# Patient Record
Sex: MALE | Race: WHITE | NOT HISPANIC OR LATINO | Employment: FULL TIME | URBAN - METROPOLITAN AREA
[De-identification: names, ages, dates, MRNs, and addresses within clinical notes are randomized per-mention and may not be internally consistent; named-entity substitution may affect disease eponyms.]

---

## 2017-02-20 ENCOUNTER — HOSPITAL ENCOUNTER (OUTPATIENT)
Dept: CARDIOLOGY | Facility: HOSPITAL | Age: 52
Setting detail: HOSPITAL OUTPATIENT SURGERY
Discharge: HOME | End: 2017-02-20
Attending: ORTHOPAEDIC SURGERY | Admitting: ORTHOPAEDIC SURGERY

## 2017-02-20 ENCOUNTER — OFFICE VISIT (OUTPATIENT)
Dept: ORTHOPEDIC SURGERY | Facility: CLINIC | Age: 52
End: 2017-02-20

## 2017-02-20 ENCOUNTER — TELEPHONE (OUTPATIENT)
Dept: ORTHOPEDIC SURGERY | Facility: CLINIC | Age: 52
End: 2017-02-20

## 2017-02-20 VITALS — WEIGHT: 294 LBS | HEIGHT: 73 IN | TEMPERATURE: 98.4 F | BODY MASS INDEX: 38.97 KG/M2

## 2017-02-20 DIAGNOSIS — M79.89 PAIN AND SWELLING OF LOWER LEG, LEFT: ICD-10-CM

## 2017-02-20 DIAGNOSIS — S99.912A LEFT ANKLE INJURY, INITIAL ENCOUNTER: Primary | ICD-10-CM

## 2017-02-20 DIAGNOSIS — M79.662 PAIN AND SWELLING OF LOWER LEG, LEFT: ICD-10-CM

## 2017-02-20 DIAGNOSIS — S99.912A LEFT ANKLE INJURY, INITIAL ENCOUNTER: ICD-10-CM

## 2017-02-20 PROBLEM — M79.669 PAIN AND SWELLING OF LOWER LEG: Status: ACTIVE | Noted: 2017-02-20

## 2017-02-20 LAB
BH CV LOWER VASCULAR LEFT COMMON FEMORAL AUGMENT: NORMAL
BH CV LOWER VASCULAR LEFT COMMON FEMORAL COMPETENT: NORMAL
BH CV LOWER VASCULAR LEFT COMMON FEMORAL COMPRESS: NORMAL
BH CV LOWER VASCULAR LEFT COMMON FEMORAL PHASIC: NORMAL
BH CV LOWER VASCULAR LEFT COMMON FEMORAL SPONT: NORMAL
BH CV LOWER VASCULAR LEFT DISTAL FEMORAL COMPRESS: NORMAL
BH CV LOWER VASCULAR LEFT GASTRONEMIUS COMPRESS: NORMAL
BH CV LOWER VASCULAR LEFT GREATER SAPH AK COMPRESS: NORMAL
BH CV LOWER VASCULAR LEFT GREATER SAPH BK COMPRESS: NORMAL
BH CV LOWER VASCULAR LEFT LESSER SAPH COMPRESS: NORMAL
BH CV LOWER VASCULAR LEFT MID FEMORAL AUGMENT: NORMAL
BH CV LOWER VASCULAR LEFT MID FEMORAL COMPETENT: NORMAL
BH CV LOWER VASCULAR LEFT MID FEMORAL COMPRESS: NORMAL
BH CV LOWER VASCULAR LEFT MID FEMORAL PHASIC: NORMAL
BH CV LOWER VASCULAR LEFT MID FEMORAL SPONT: NORMAL
BH CV LOWER VASCULAR LEFT PERONEAL COMPRESS: NORMAL
BH CV LOWER VASCULAR LEFT POPLITEAL AUGMENT: NORMAL
BH CV LOWER VASCULAR LEFT POPLITEAL COMPETENT: NORMAL
BH CV LOWER VASCULAR LEFT POPLITEAL COMPRESS: NORMAL
BH CV LOWER VASCULAR LEFT POPLITEAL PHASIC: NORMAL
BH CV LOWER VASCULAR LEFT POPLITEAL SPONT: NORMAL
BH CV LOWER VASCULAR LEFT POSTERIOR TIBIAL COMPRESS: NORMAL
BH CV LOWER VASCULAR LEFT PROXIMAL FEMORAL COMPRESS: NORMAL
BH CV LOWER VASCULAR LEFT SAPHENOFEMORAL JUNCTION AUGMENT: NORMAL
BH CV LOWER VASCULAR LEFT SAPHENOFEMORAL JUNCTION COMPETENT: NORMAL
BH CV LOWER VASCULAR LEFT SAPHENOFEMORAL JUNCTION COMPRESS: NORMAL
BH CV LOWER VASCULAR LEFT SAPHENOFEMORAL JUNCTION PHASIC: NORMAL
BH CV LOWER VASCULAR LEFT SAPHENOFEMORAL JUNCTION SPONT: NORMAL
BH CV LOWER VASCULAR RIGHT COMMON FEMORAL AUGMENT: NORMAL
BH CV LOWER VASCULAR RIGHT COMMON FEMORAL COMPETENT: NORMAL
BH CV LOWER VASCULAR RIGHT COMMON FEMORAL COMPRESS: NORMAL
BH CV LOWER VASCULAR RIGHT COMMON FEMORAL PHASIC: NORMAL
BH CV LOWER VASCULAR RIGHT COMMON FEMORAL SPONT: NORMAL

## 2017-02-20 PROCEDURE — 93971 EXTREMITY STUDY: CPT

## 2017-02-20 PROCEDURE — 73610 X-RAY EXAM OF ANKLE: CPT | Performed by: ORTHOPAEDIC SURGERY

## 2017-02-20 PROCEDURE — 99204 OFFICE O/P NEW MOD 45 MIN: CPT | Performed by: ORTHOPAEDIC SURGERY

## 2017-02-20 NOTE — PROGRESS NOTES
"   New Patient Complaint      Patient: Fran Houser  YOB: 1965 51 y.o. male  Medical Record Number: [id number]    Chief Complaints: My leg hurts    History of Present Illness:   Patient fell 5-6 weeks go through a deck steps injuring the anterior aspect of his left lower leg and quite a bit of swelling pain and bruising the digit improved to some degree but that she's got more active walking his dog etc. he's had significant increase in pain now with moderate to severe constant burning pain over the anterior aspect of his left leg was some redness and swelling worse with prolonged standing improved anti-inflammatories ice and rest.  His leg is less swollen when he gets up in the morning but swells progressively during the day.       HPI    Allergies: No Known Allergies    Medications:   No current outpatient prescriptions on file prior to visit.     No current facility-administered medications on file prior to visit.        History reviewed. No pertinent past medical history.  History reviewed. No pertinent past surgical history.  Social History     Occupational History   • Not on file.     Social History Main Topics   • Smoking status: Never Smoker   • Smokeless tobacco: Not on file   • Alcohol use Yes   • Drug use: Not on file   • Sexual activity: Not on file      Social History     Social History Narrative   • No narrative on file     History reviewed. No pertinent family history.    Review of Systems: 14 point review of systems performed, positive pertinent findings identified in HPI. All remaining systems negative     Review of Systems      Physical Exam:   Vitals:    02/20/17 1453   Temp: 98.4 °F (36.9 °C)   Weight: 294 lb (133 kg)   Height: 73\" (185.4 cm)   Physical Exam   Constitutional: pleasant, well developed   Eyes: sclera non icteric  Hearing : adequate for exam  Cardiovascular: palpable pulses in foot, calf/ thigh NT without sign of DVT  Respiratoy: breathing unlabored   Neurological: " grossly sensate to LT throughout LE  Psychiatric: oriented with normal mood and affect.   Lymphatic: No palpable popliteal lymphadenopathy  Skin: intact throughout leg/foot  Musculoskeletal: Moderate swelling to both legs it's somewhat more on the left than the right.  Left leg shows skin to be intact there is little bit of redness over the anterior aspect of his left distal leg with moderate tenderness to palpation to this area.  There is no surrounding erythema or sign of cellulitis.  Moderate swelling to the left calf.  Mild discomfort of the ankle with range of motion minimal discomfort over the right lateral fibula.  Physical Exam  Ortho Exam    Radiology: 3 views left ankle ordered today to evaluate ankle pain reviewed no prior x-rays available for comparison.  I don't see any obvious fracture though there is a questionable area over the distal posterior fibula.    Assessment/Plan: Left leg pain after injury of unclear etiology.  I'll see any obvious fracture but I worry about an evolving stress fracture or stress reaction given his increased discomfort with activities.  When have him take things very easy.  We decided to hold off on a boot today that he will limit activity as pain allows.  1 to get an MRI of his left ankle with a gel marker to make shows no obvious fracture of the distal one third of the tibia.  Also fitted him with some compression hose to help with swelling and get a Doppler to make sure there is no blood clot.  I will see him back pending those results.

## 2017-05-09 ENCOUNTER — OFFICE VISIT (OUTPATIENT)
Dept: RETAIL CLINIC | Facility: CLINIC | Age: 52
End: 2017-05-09

## 2017-05-09 VITALS
HEART RATE: 70 BPM | SYSTOLIC BLOOD PRESSURE: 114 MMHG | TEMPERATURE: 99.5 F | DIASTOLIC BLOOD PRESSURE: 72 MMHG | RESPIRATION RATE: 18 BRPM | OXYGEN SATURATION: 97 %

## 2017-05-09 DIAGNOSIS — H66.002 ACUTE SUPPURATIVE OTITIS MEDIA OF LEFT EAR WITHOUT SPONTANEOUS RUPTURE OF TYMPANIC MEMBRANE, RECURRENCE NOT SPECIFIED: ICD-10-CM

## 2017-05-09 DIAGNOSIS — H60.392 OTHER INFECTIVE ACUTE OTITIS EXTERNA OF LEFT EAR: ICD-10-CM

## 2017-05-09 DIAGNOSIS — J01.01 ACUTE RECURRENT MAXILLARY SINUSITIS: Primary | ICD-10-CM

## 2017-05-09 PROCEDURE — 99203 OFFICE O/P NEW LOW 30 MIN: CPT | Performed by: NURSE PRACTITIONER

## 2017-05-09 RX ORDER — FLUTICASONE PROPIONATE 50 MCG
2 SPRAY, SUSPENSION (ML) NASAL DAILY
Qty: 1 EACH | Refills: 0 | Status: SHIPPED | OUTPATIENT
Start: 2017-05-09 | End: 2017-06-08

## 2017-05-09 RX ORDER — CEFDINIR 300 MG/1
300 CAPSULE ORAL 2 TIMES DAILY
Qty: 20 CAPSULE | Refills: 0 | Status: SHIPPED | OUTPATIENT
Start: 2017-05-09 | End: 2017-05-19

## 2017-08-21 ENCOUNTER — APPOINTMENT (OUTPATIENT)
Dept: CT IMAGING | Facility: HOSPITAL | Age: 52
End: 2017-08-21

## 2017-08-21 ENCOUNTER — HOSPITAL ENCOUNTER (EMERGENCY)
Facility: HOSPITAL | Age: 52
Discharge: HOME | End: 2017-08-21
Admitting: PHYSICIAN ASSISTANT

## 2017-08-21 VITALS
HEIGHT: 73 IN | HEART RATE: 57 BPM | BODY MASS INDEX: 38.3 KG/M2 | DIASTOLIC BLOOD PRESSURE: 80 MMHG | TEMPERATURE: 98.4 F | SYSTOLIC BLOOD PRESSURE: 137 MMHG | RESPIRATION RATE: 18 BRPM | WEIGHT: 289 LBS | OXYGEN SATURATION: 97 %

## 2017-08-21 DIAGNOSIS — R19.7 DIARRHEA, UNSPECIFIED TYPE: Primary | ICD-10-CM

## 2017-08-21 LAB
ALBUMIN SERPL-MCNC: 4.2 G/DL (ref 3.5–5.2)
ALBUMIN/GLOB SERPL: 1.6 G/DL
ALP SERPL-CCNC: 57 U/L (ref 40–129)
ALT SERPL W P-5'-P-CCNC: 37 U/L (ref 5–41)
ANION GAP SERPL CALCULATED.3IONS-SCNC: 11.3 MMOL/L
AST SERPL-CCNC: 22 U/L (ref 5–40)
BASOPHILS # BLD AUTO: 0.02 10*3/MM3 (ref 0–0.2)
BASOPHILS NFR BLD AUTO: 0.4 % (ref 0–2)
BILIRUB SERPL-MCNC: 0.4 MG/DL (ref 0.2–1.2)
BILIRUB UR QL STRIP: NEGATIVE
BUN BLD-MCNC: 7 MG/DL (ref 6–20)
BUN/CREAT SERPL: 7.3 (ref 7–25)
CALCIUM SPEC-SCNC: 8.8 MG/DL (ref 8.6–10.5)
CHLORIDE SERPL-SCNC: 101 MMOL/L (ref 98–107)
CLARITY UR: CLEAR
CO2 SERPL-SCNC: 25.7 MMOL/L (ref 22–29)
COLOR UR: YELLOW
CREAT BLD-MCNC: 0.96 MG/DL (ref 0.76–1.27)
D-LACTATE SERPL-SCNC: 1.1 MMOL/L (ref 0.5–2)
DEPRECATED RDW RBC AUTO: 39.8 FL (ref 37–54)
EOSINOPHIL # BLD AUTO: 0.19 10*3/MM3 (ref 0.1–0.3)
EOSINOPHIL NFR BLD AUTO: 3.4 % (ref 0–4)
ERYTHROCYTE [DISTWIDTH] IN BLOOD BY AUTOMATED COUNT: 12.2 % (ref 11.5–14.5)
GFR SERPL CREATININE-BSD FRML MDRD: 83 ML/MIN/1.73
GLOBULIN UR ELPH-MCNC: 2.6 GM/DL
GLUCOSE BLD-MCNC: 97 MG/DL (ref 65–99)
GLUCOSE UR STRIP-MCNC: NEGATIVE MG/DL
HCT VFR BLD AUTO: 41.1 % (ref 42–52)
HGB BLD-MCNC: 14 G/DL (ref 14–18)
HGB UR QL STRIP.AUTO: NEGATIVE
IMM GRANULOCYTES # BLD: 0.01 10*3/MM3 (ref 0–0.03)
IMM GRANULOCYTES NFR BLD: 0.2 % (ref 0–0.5)
KETONES UR QL STRIP: NEGATIVE
LEUKOCYTE ESTERASE UR QL STRIP.AUTO: NEGATIVE
LIPASE SERPL-CCNC: 17 U/L (ref 13–60)
LYMPHOCYTES # BLD AUTO: 1.54 10*3/MM3 (ref 0.6–4.8)
LYMPHOCYTES NFR BLD AUTO: 27.5 % (ref 20–45)
MCH RBC QN AUTO: 30.6 PG (ref 27–31)
MCHC RBC AUTO-ENTMCNC: 34.1 G/DL (ref 31–37)
MCV RBC AUTO: 89.7 FL (ref 80–94)
MONOCYTES # BLD AUTO: 0.65 10*3/MM3 (ref 0–1)
MONOCYTES NFR BLD AUTO: 11.6 % (ref 3–8)
NEUTROPHILS # BLD AUTO: 3.2 10*3/MM3 (ref 1.5–8.3)
NEUTROPHILS NFR BLD AUTO: 56.9 % (ref 45–70)
NITRITE UR QL STRIP: NEGATIVE
NRBC BLD MANUAL-RTO: 0 /100 WBC (ref 0–0)
PH UR STRIP.AUTO: <=5 [PH] (ref 4.5–8)
PLATELET # BLD AUTO: 222 10*3/MM3 (ref 140–500)
PMV BLD AUTO: 9.1 FL (ref 7.4–10.4)
POTASSIUM BLD-SCNC: 4.6 MMOL/L (ref 3.5–5.2)
PROT SERPL-MCNC: 6.8 G/DL (ref 6–8.5)
PROT UR QL STRIP: NEGATIVE
RBC # BLD AUTO: 4.58 10*6/MM3 (ref 4.7–6.1)
RBC MORPH BLD: NORMAL
SMALL PLATELETS BLD QL SMEAR: ADEQUATE
SODIUM BLD-SCNC: 138 MMOL/L (ref 136–145)
SP GR UR STRIP: <=1.005 (ref 1–1.03)
UROBILINOGEN UR QL STRIP: NORMAL
WBC MORPH BLD: NORMAL
WBC NRBC COR # BLD: 5.61 10*3/MM3 (ref 4.8–10.8)

## 2017-08-21 PROCEDURE — 74177 CT ABD & PELVIS W/CONTRAST: CPT

## 2017-08-21 PROCEDURE — 99284 EMERGENCY DEPT VISIT MOD MDM: CPT | Performed by: PHYSICIAN ASSISTANT

## 2017-08-21 PROCEDURE — 85025 COMPLETE CBC W/AUTO DIFF WBC: CPT | Performed by: PHYSICIAN ASSISTANT

## 2017-08-21 PROCEDURE — 96361 HYDRATE IV INFUSION ADD-ON: CPT

## 2017-08-21 PROCEDURE — 87046 STOOL CULTR AEROBIC BACT EA: CPT | Performed by: PHYSICIAN ASSISTANT

## 2017-08-21 PROCEDURE — 87493 C DIFF AMPLIFIED PROBE: CPT | Performed by: PHYSICIAN ASSISTANT

## 2017-08-21 PROCEDURE — 80053 COMPREHEN METABOLIC PANEL: CPT | Performed by: PHYSICIAN ASSISTANT

## 2017-08-21 PROCEDURE — 96374 THER/PROPH/DIAG INJ IV PUSH: CPT

## 2017-08-21 PROCEDURE — 85007 BL SMEAR W/DIFF WBC COUNT: CPT | Performed by: PHYSICIAN ASSISTANT

## 2017-08-21 PROCEDURE — 81003 URINALYSIS AUTO W/O SCOPE: CPT | Performed by: PHYSICIAN ASSISTANT

## 2017-08-21 PROCEDURE — 83605 ASSAY OF LACTIC ACID: CPT | Performed by: PHYSICIAN ASSISTANT

## 2017-08-21 PROCEDURE — 87045 FECES CULTURE AEROBIC BACT: CPT | Performed by: PHYSICIAN ASSISTANT

## 2017-08-21 PROCEDURE — 99283 EMERGENCY DEPT VISIT LOW MDM: CPT

## 2017-08-21 PROCEDURE — 0 IOPAMIDOL PER 1 ML: Performed by: PHYSICIAN ASSISTANT

## 2017-08-21 PROCEDURE — 83690 ASSAY OF LIPASE: CPT | Performed by: PHYSICIAN ASSISTANT

## 2017-08-21 RX ORDER — SODIUM CHLORIDE 0.9 % (FLUSH) 0.9 %
10 SYRINGE (ML) INJECTION AS NEEDED
Status: DISCONTINUED | OUTPATIENT
Start: 2017-08-21 | End: 2017-08-21 | Stop reason: HOSPADM

## 2017-08-21 RX ORDER — SACCHAROMYCES BOULARDII 250 MG
250 CAPSULE ORAL 2 TIMES DAILY
Qty: 28 CAPSULE | Refills: 0 | Status: SHIPPED | OUTPATIENT
Start: 2017-08-21 | End: 2017-09-04

## 2017-08-21 RX ORDER — FAMOTIDINE 20 MG/1
20 TABLET, FILM COATED ORAL NIGHTLY PRN
Qty: 30 TABLET | Refills: 0 | Status: ON HOLD | OUTPATIENT
Start: 2017-08-21 | End: 2018-04-30

## 2017-08-21 RX ORDER — CIPROFLOXACIN 500 MG/1
500 TABLET, FILM COATED ORAL 2 TIMES DAILY
Status: ON HOLD | COMMUNITY
End: 2018-04-30

## 2017-08-21 RX ORDER — FAMOTIDINE 10 MG/ML
20 INJECTION, SOLUTION INTRAVENOUS ONCE
Status: COMPLETED | OUTPATIENT
Start: 2017-08-21 | End: 2017-08-21

## 2017-08-21 RX ADMIN — FAMOTIDINE 20 MG: 10 INJECTION, SOLUTION INTRAVENOUS at 13:26

## 2017-08-21 RX ADMIN — IOPAMIDOL 100 ML: 755 INJECTION, SOLUTION INTRAVENOUS at 15:47

## 2017-08-21 RX ADMIN — SODIUM CHLORIDE 1000 ML: 9 INJECTION, SOLUTION INTRAVENOUS at 13:25

## 2017-08-21 NOTE — ED TRIAGE NOTES
"Pt reports that he talked to his dr over the phone per his insurance and received a prescription for Cipro 500 mg BID for 3 days, he took the last one today. When asked what his diagnosis was he said \"I guess it's for the bacteria\" but then said he wasn't sure why he was given Cipro.  Pt reported no other unusual features to his stool, no pain and no other symptoms except occasional chills  "

## 2017-08-21 NOTE — ED PROVIDER NOTES
Subjective   History of Present Illness  History of Present Illness    Chief complaint: Diarrhea    Location: Diffuse abdomen    Quality/Severity:  Watery.  Nonbloody.  Severe.  At least 10 episodes a day    Timing/Duration: 5 days    Modifying Factors: Nothing makes worse or better    Associated Symptoms: Denies abdominal pain.  Denies fevers or chills.  Denies nausea vomiting.  Denies rectal bleeding.  Denies recent travel.    Narrative: 52-year-old male presents with diarrhea for 5 days.  3 days ago he called his insurance online physician and he was given a prescription for Cipro which he started taking.  He was not on any antibiotics prior to the diarrhea starting.  It has not helped diarrhea at all.  He states that initially he did have a foul odor but now it is odorless.  He has never had a colonoscopy.    Review of Systems  General: Denies fevers or chills.  Denies any weakness or fatigue.  Denies any weight loss or weight gain.  SKIN: Denies any rashes lesions or ulcers.  Denies color change.  ENT: Denies sore throat or rhinorrhea.  Denies ear pain.    EYES: Denies any blurred vision.  Denies any change in vision.  Denies any photophobia.  Denies any vision loss.  LUNGS: Denies any shortness of breath or wheezing.  Denies any cough.  Denies any hemoptysis.  CARDIAC: Denies any chest pain.  Denies palpitations.  Denies syncope.  Denies any edema  ABD: + diarrhea. Denies any abdominal pain.  Denies any nausea or vomiting   Denies any rectal bleeding.  Denies constipation  : Denies any dysuria, urgency, frequency or hematuria.  Denies discharge.  Denies flank pain.  NEURO: Denies any focal weakness.  Denies headache.  Denies seizures.  Denies changes in speech or difficulty walking.  ENDOCRINE: Denies polydipsia and polyuria  M/S: Denies arthralgias, back pain, myalgias or neck pain  HEME/LYMPH: Negative for adenopathy. Does not bruise/bleed easily.   PSYCH: Negative for suicidal ideas. Denies anxiety or  depression  review was performed in addition to those in the above all other reviews are negative.      History reviewed. No pertinent past medical history.    No Known Allergies    History reviewed. No pertinent surgical history.    Family History   Problem Relation Age of Onset   • Diabetes Mother    • Thyroid disease Mother    • Hypertension Mother    • Heart disease Father    • Heart attack Father    • Diabetes Maternal Grandfather        Social History     Social History   • Marital status:      Spouse name: N/A   • Number of children: N/A   • Years of education: N/A     Social History Main Topics   • Smoking status: Never Smoker   • Smokeless tobacco: None   • Alcohol use Yes      Comment: rare   • Drug use: No   • Sexual activity: Yes     Partners: Female     Other Topics Concern   • None     Social History Narrative     No current facility-administered medications on file prior to encounter.      Current Outpatient Prescriptions on File Prior to Encounter   Medication Sig Dispense Refill   • Diphenhydramine-APAP, sleep, (TYLENOL PM EXTRA STRENGTH PO) Take  by mouth.             Objective   Physical Exam  Vitals:    08/21/17 1234   BP:    Pulse:    Resp:    Temp: 98.4 °F (36.9 °C)   SpO2:    Mature 98.4.  Blood pressure 148/85.  Heart rate 60.  Respirations 18.  Oxygen saturation is 97% on room air.    GENERAL: a/o x 4, NAD  SKIN: Warm pink and dry   HEENT:  PERRLA, EOM intact, conjunctiva normal, sclera clear  NECK: supple, no JVD  LUNGS: Clear to auscultation bilaterally without wheezes, rales or rhonchi.  No accessory muscle use and no nasal flaring.  CARDIAC:  Regular rate and rhythm, S1-S2.  No murmurs, rubs or gallops.  No peripheral edema.  Equal pulses bilaterally.  ABDOMEN: Soft, diffuse mild tenderness.  nondistended.  No guarding or rebound tenderness.  Normal bowel sounds.  MUSCULOSKELETAL: Moves all extremities well.  No deformity.  NEURO: Cranial nerves II through XII grossly intact.  No  gross focal deficits.  Alert.  Normal speech and motor.  PSYCH: Normal mood and affect        Procedures         ED Course  ED Course    IVF, pepcid given.    Results for orders placed or performed during the hospital encounter of 08/21/17   Comprehensive Metabolic Panel   Result Value Ref Range    Glucose 97 65 - 99 mg/dL    BUN 7 6 - 20 mg/dL    Creatinine 0.96 0.76 - 1.27 mg/dL    Sodium 138 136 - 145 mmol/L    Potassium 4.6 3.5 - 5.2 mmol/L    Chloride 101 98 - 107 mmol/L    CO2 25.7 22.0 - 29.0 mmol/L    Calcium 8.8 8.6 - 10.5 mg/dL    Total Protein 6.8 6.0 - 8.5 g/dL    Albumin 4.20 3.50 - 5.20 g/dL    ALT (SGPT) 37 5 - 41 U/L    AST (SGOT) 22 5 - 40 U/L    Alkaline Phosphatase 57 40 - 129 U/L    Total Bilirubin 0.4 0.2 - 1.2 mg/dL    eGFR Non African Amer 83 >60 mL/min/1.73    Globulin 2.6 gm/dL    A/G Ratio 1.6 g/dL    BUN/Creatinine Ratio 7.3 7.0 - 25.0    Anion Gap 11.3 mmol/L   Lipase   Result Value Ref Range    Lipase 17 13 - 60 U/L   Urinalysis With / Culture If Indicated   Result Value Ref Range    Color, UA Yellow Yellow, Straw    Appearance, UA Clear Clear    pH, UA <=5.0 4.5 - 8.0    Specific Gravity, UA <=1.005 1.003 - 1.030    Glucose, UA Negative Negative    Ketones, UA Negative Negative, 80 mg/dL (3+), >=160 mg/dL (4+)    Bilirubin, UA Negative Negative    Blood, UA Negative Negative    Protein, UA Negative Negative    Leuk Esterase, UA Negative Negative    Nitrite, UA Negative Negative    Urobilinogen, UA 0.2 E.U./dL 0.2 - 1.0 E.U./dL   Lactic Acid, Plasma   Result Value Ref Range    Lactate 1.1 0.5 - 2.0 mmol/L   CBC Auto Differential   Result Value Ref Range    WBC 5.61 4.80 - 10.80 10*3/mm3    RBC 4.58 (L) 4.70 - 6.10 10*6/mm3    Hemoglobin 14.0 14.0 - 18.0 g/dL    Hematocrit 41.1 (L) 42.0 - 52.0 %    MCV 89.7 80.0 - 94.0 fL    MCH 30.6 27.0 - 31.0 pg    MCHC 34.1 31.0 - 37.0 g/dL    RDW 12.2 11.5 - 14.5 %    RDW-SD 39.8 37.0 - 54.0 fl    MPV 9.1 7.4 - 10.4 fL    Platelets 222 140 - 500  10*3/mm3    Neutrophil % 56.9 45.0 - 70.0 %    Lymphocyte % 27.5 20.0 - 45.0 %    Monocyte % 11.6 (H) 3.0 - 8.0 %    Eosinophil % 3.4 0.0 - 4.0 %    Basophil % 0.4 0.0 - 2.0 %    Immature Grans % 0.2 0.0 - 0.5 %    Neutrophils, Absolute 3.20 1.50 - 8.30 10*3/mm3    Lymphocytes, Absolute 1.54 0.60 - 4.80 10*3/mm3    Monocytes, Absolute 0.65 0.00 - 1.00 10*3/mm3    Eosinophils, Absolute 0.19 0.10 - 0.30 10*3/mm3    Basophils, Absolute 0.02 0.00 - 0.20 10*3/mm3    Immature Grans, Absolute 0.01 0.00 - 0.03 10*3/mm3    nRBC 0.0 0.0 - 0.0 /100 WBC   Scan Slide   Result Value Ref Range    RBC Morphology Normal Normal    WBC Morphology Normal Normal    Platelet Estimate Adequate Normal     Ct Abdomen Pelvis With Contrast    Result Date: 8/21/2017  Narrative: INDICATION: Diarrhea for 6 days. Nausea..  TECHNIQUE: CT of the abdomen and pelvis with p.o. and IV contrast. Coronal and sagittal reconstructions were obtained.  Radiation dose reduction techniques were utilized, including automated exposure control and exposure modulation based on body size.  COMPARISON: None available..  FINDINGS: Abdomen: The solid abdominal organs are normal. The gallbladder is not distended.  The bowel is not dilated.  The appendix is normal. Small umbilical hernia.  Pelvis: No pelvic mass or free pelvic fluid.  No enlarged pelvic or inguinal lymph nodes..  No acute osseous abnormalities. There is an old transverse process fracture on the right at L2.      Impression: Impression: No acute findings in the abdomen or pelvis.  This report was finalized on 8/21/2017 3:54 PM by Dr. Reji Torres MD.      Reviewed CT a/p. Independently viewed by me. Interpreted by radiologist. Discussed with pt. Electrolytes and creatinine normal.  Educated patient on diarrhea and need for follow-up.  He has been given information on a diarrhea appropriate diet.            MDM  Number of Diagnoses or Management Options  Diarrhea, unspecified type: new and requires  workup     Amount and/or Complexity of Data Reviewed  Clinical lab tests: reviewed and ordered  Tests in the radiology section of CPT®: ordered and reviewed  Tests in the medicine section of CPT®: ordered and reviewed  Independent visualization of images, tracings, or specimens: yes    Risk of Complications, Morbidity, and/or Mortality  Presenting problems: moderate  Diagnostic procedures: moderate  Management options: moderate    Patient Progress  Patient progress: improved      Differentials include, but not limited to: C. difficile.  Gastroenteritis, viral infection, diverticulitis, infectious, IBD, IBS  Final diagnoses:   Diarrhea, unspecified type       EMR Dragon/Transcription disclaimer:      Much of this encounter note is an electronic transcription/translation of spoken language to printed text. The electronic translation of spoken language may permit erroneous, or at times, nonsensical words or phrases to be inadvertently transcribed; Although I have reviewed the note for such errors, some may still exist.            Mili Beltre PA-C  08/21/17 8068

## 2017-08-22 LAB — C DIFF TOX GENS STL QL NAA+PROBE: NEGATIVE

## 2017-08-23 LAB
BACTERIA SPEC AEROBE CULT: ABNORMAL

## 2018-04-30 ENCOUNTER — ANESTHESIA (OUTPATIENT)
Dept: PERIOP | Facility: HOSPITAL | Age: 53
End: 2018-04-30

## 2018-04-30 ENCOUNTER — HOSPITAL ENCOUNTER (OUTPATIENT)
Facility: HOSPITAL | Age: 53
Discharge: HOME | End: 2018-05-02
Attending: EMERGENCY MEDICINE | Admitting: SURGERY

## 2018-04-30 ENCOUNTER — APPOINTMENT (OUTPATIENT)
Dept: CT IMAGING | Facility: HOSPITAL | Age: 53
End: 2018-04-30
Attending: EMERGENCY MEDICINE

## 2018-04-30 ENCOUNTER — ANESTHESIA EVENT (OUTPATIENT)
Dept: PERIOP | Facility: HOSPITAL | Age: 53
End: 2018-04-30

## 2018-04-30 DIAGNOSIS — K35.30 ACUTE APPENDICITIS WITH LOCALIZED PERITONITIS: Primary | ICD-10-CM

## 2018-04-30 LAB
ALBUMIN SERPL-MCNC: 4.3 G/DL (ref 3.5–5.2)
ALBUMIN/GLOB SERPL: 1.5 G/DL
ALP SERPL-CCNC: 61 U/L (ref 40–129)
ALT SERPL W P-5'-P-CCNC: 48 U/L (ref 5–41)
ANION GAP SERPL CALCULATED.3IONS-SCNC: 12.1 MMOL/L
AST SERPL-CCNC: 25 U/L (ref 5–40)
BASOPHILS # BLD AUTO: 0.02 10*3/MM3 (ref 0–0.2)
BASOPHILS NFR BLD AUTO: 0.2 % (ref 0–2)
BILIRUB SERPL-MCNC: 0.3 MG/DL (ref 0.2–1.2)
BILIRUB UR QL STRIP: NEGATIVE
BUN BLD-MCNC: 12 MG/DL (ref 6–20)
BUN/CREAT SERPL: 14.1 (ref 7–25)
CALCIUM SPEC-SCNC: 9.1 MG/DL (ref 8.6–10.5)
CHLORIDE SERPL-SCNC: 97 MMOL/L (ref 98–107)
CLARITY UR: CLEAR
CO2 SERPL-SCNC: 25.9 MMOL/L (ref 22–29)
COLOR UR: YELLOW
CREAT BLD-MCNC: 0.85 MG/DL (ref 0.76–1.27)
DEPRECATED RDW RBC AUTO: 40.7 FL (ref 37–54)
EOSINOPHIL # BLD AUTO: 0.02 10*3/MM3 (ref 0.1–0.3)
EOSINOPHIL NFR BLD AUTO: 0.2 % (ref 0–4)
ERYTHROCYTE [DISTWIDTH] IN BLOOD BY AUTOMATED COUNT: 12.3 % (ref 11.5–14.5)
GFR SERPL CREATININE-BSD FRML MDRD: 95 ML/MIN/1.73
GLOBULIN UR ELPH-MCNC: 2.9 GM/DL
GLUCOSE BLD-MCNC: 129 MG/DL (ref 65–99)
GLUCOSE UR STRIP-MCNC: ABNORMAL MG/DL
HCT VFR BLD AUTO: 40.8 % (ref 42–52)
HGB BLD-MCNC: 14.3 G/DL (ref 14–18)
HGB UR QL STRIP.AUTO: NEGATIVE
IMM GRANULOCYTES # BLD: 0.07 10*3/MM3 (ref 0–0.03)
IMM GRANULOCYTES NFR BLD: 0.6 % (ref 0–0.5)
KETONES UR QL STRIP: NEGATIVE
LEUKOCYTE ESTERASE UR QL STRIP.AUTO: NEGATIVE
LIPASE SERPL-CCNC: 15 U/L (ref 13–60)
LYMPHOCYTES # BLD AUTO: 0.69 10*3/MM3 (ref 0.6–4.8)
LYMPHOCYTES NFR BLD AUTO: 5.5 % (ref 20–45)
MCH RBC QN AUTO: 31.6 PG (ref 27–31)
MCHC RBC AUTO-ENTMCNC: 35 G/DL (ref 31–37)
MCV RBC AUTO: 90.3 FL (ref 80–94)
MONOCYTES # BLD AUTO: 0.57 10*3/MM3 (ref 0–1)
MONOCYTES NFR BLD AUTO: 4.5 % (ref 3–8)
NEUTROPHILS # BLD AUTO: 11.17 10*3/MM3 (ref 1.5–8.3)
NEUTROPHILS NFR BLD AUTO: 89 % (ref 45–70)
NITRITE UR QL STRIP: NEGATIVE
NRBC BLD MANUAL-RTO: 0 /100 WBC (ref 0–0)
PH UR STRIP.AUTO: 7 [PH] (ref 4.5–8)
PLATELET # BLD AUTO: 237 10*3/MM3 (ref 140–500)
PMV BLD AUTO: 9.8 FL (ref 7.4–10.4)
POTASSIUM BLD-SCNC: 4.1 MMOL/L (ref 3.5–5.2)
PROT SERPL-MCNC: 7.2 G/DL (ref 6–8.5)
PROT UR QL STRIP: NEGATIVE
RBC # BLD AUTO: 4.52 10*6/MM3 (ref 4.7–6.1)
SODIUM BLD-SCNC: 135 MMOL/L (ref 136–145)
SP GR UR STRIP: 1.02 (ref 1–1.03)
UROBILINOGEN UR QL STRIP: ABNORMAL
WBC NRBC COR # BLD: 12.54 10*3/MM3 (ref 4.8–10.8)

## 2018-04-30 PROCEDURE — 85025 COMPLETE CBC W/AUTO DIFF WBC: CPT | Performed by: EMERGENCY MEDICINE

## 2018-04-30 PROCEDURE — 25010000002 MORPHINE PER 10 MG: Performed by: SURGERY

## 2018-04-30 PROCEDURE — 25010000002 HYDROMORPHONE PER 4 MG: Performed by: NURSE ANESTHETIST, CERTIFIED REGISTERED

## 2018-04-30 PROCEDURE — 94799 UNLISTED PULMONARY SVC/PX: CPT

## 2018-04-30 PROCEDURE — G0378 HOSPITAL OBSERVATION PER HR: HCPCS

## 2018-04-30 PROCEDURE — 96360 HYDRATION IV INFUSION INIT: CPT

## 2018-04-30 PROCEDURE — 25010000002 DEXAMETHASONE PER 1 MG: Performed by: NURSE ANESTHETIST, CERTIFIED REGISTERED

## 2018-04-30 PROCEDURE — 87070 CULTURE OTHR SPECIMN AEROBIC: CPT | Performed by: SURGERY

## 2018-04-30 PROCEDURE — 25010000002 NEOSTIGMINE PER 0.5 MG: Performed by: NURSE ANESTHETIST, CERTIFIED REGISTERED

## 2018-04-30 PROCEDURE — 74177 CT ABD & PELVIS W/CONTRAST: CPT

## 2018-04-30 PROCEDURE — 87176 TISSUE HOMOGENIZATION CULTR: CPT | Performed by: SURGERY

## 2018-04-30 PROCEDURE — A9270 NON-COVERED ITEM OR SERVICE: HCPCS | Performed by: SURGERY

## 2018-04-30 PROCEDURE — 80053 COMPREHEN METABOLIC PANEL: CPT | Performed by: EMERGENCY MEDICINE

## 2018-04-30 PROCEDURE — 25010000002 ONDANSETRON PER 1 MG: Performed by: EMERGENCY MEDICINE

## 2018-04-30 PROCEDURE — 63710000001 OXYCODONE-ACETAMINOPHEN 5-325 MG TABLET: Performed by: SURGERY

## 2018-04-30 PROCEDURE — 44970 LAPAROSCOPY APPENDECTOMY: CPT | Performed by: SPECIALIST/TECHNOLOGIST, OTHER

## 2018-04-30 PROCEDURE — 44970 LAPAROSCOPY APPENDECTOMY: CPT | Performed by: SURGERY

## 2018-04-30 PROCEDURE — 25010000002 FENTANYL CITRATE (PF) 100 MCG/2ML SOLUTION: Performed by: NURSE ANESTHETIST, CERTIFIED REGISTERED

## 2018-04-30 PROCEDURE — 96375 TX/PRO/DX INJ NEW DRUG ADDON: CPT

## 2018-04-30 PROCEDURE — 96361 HYDRATE IV INFUSION ADD-ON: CPT

## 2018-04-30 PROCEDURE — 93010 ELECTROCARDIOGRAM REPORT: CPT | Performed by: INTERNAL MEDICINE

## 2018-04-30 PROCEDURE — 25010000002 MIDAZOLAM PER 1 MG: Performed by: NURSE ANESTHETIST, CERTIFIED REGISTERED

## 2018-04-30 PROCEDURE — 96376 TX/PRO/DX INJ SAME DRUG ADON: CPT

## 2018-04-30 PROCEDURE — 99284 EMERGENCY DEPT VISIT MOD MDM: CPT

## 2018-04-30 PROCEDURE — 25010000002 PROPOFOL 10 MG/ML EMULSION: Performed by: NURSE ANESTHETIST, CERTIFIED REGISTERED

## 2018-04-30 PROCEDURE — 99284 EMERGENCY DEPT VISIT MOD MDM: CPT | Performed by: EMERGENCY MEDICINE

## 2018-04-30 PROCEDURE — 25010000003 CEFAZOLIN PER 500 MG: Performed by: SURGERY

## 2018-04-30 PROCEDURE — 25010000002 HYDROMORPHONE PER 4 MG: Performed by: EMERGENCY MEDICINE

## 2018-04-30 PROCEDURE — 25010000002 ONDANSETRON PER 1 MG: Performed by: NURSE ANESTHETIST, CERTIFIED REGISTERED

## 2018-04-30 PROCEDURE — 87075 CULTR BACTERIA EXCEPT BLOOD: CPT | Performed by: SURGERY

## 2018-04-30 PROCEDURE — 81003 URINALYSIS AUTO W/O SCOPE: CPT | Performed by: EMERGENCY MEDICINE

## 2018-04-30 PROCEDURE — 0 IOPAMIDOL PER 1 ML: Performed by: EMERGENCY MEDICINE

## 2018-04-30 PROCEDURE — 25010000002 SUCCINYLCHOLINE PER 20 MG: Performed by: NURSE ANESTHETIST, CERTIFIED REGISTERED

## 2018-04-30 PROCEDURE — 93005 ELECTROCARDIOGRAM TRACING: CPT | Performed by: SURGERY

## 2018-04-30 PROCEDURE — 87205 SMEAR GRAM STAIN: CPT | Performed by: SURGERY

## 2018-04-30 PROCEDURE — 99220 PR INITIAL OBSERVATION CARE/DAY 70 MINUTES: CPT | Performed by: SURGERY

## 2018-04-30 PROCEDURE — 83690 ASSAY OF LIPASE: CPT | Performed by: EMERGENCY MEDICINE

## 2018-04-30 RX ORDER — MAGNESIUM HYDROXIDE 1200 MG/15ML
LIQUID ORAL AS NEEDED
Status: DISCONTINUED | OUTPATIENT
Start: 2018-04-30 | End: 2018-04-30 | Stop reason: HOSPADM

## 2018-04-30 RX ORDER — HYDROMORPHONE HCL 110MG/55ML
0.5 PATIENT CONTROLLED ANALGESIA SYRINGE INTRAVENOUS ONCE
Status: COMPLETED | OUTPATIENT
Start: 2018-04-30 | End: 2018-04-30

## 2018-04-30 RX ORDER — MIDAZOLAM HYDROCHLORIDE 1 MG/ML
1 INJECTION INTRAMUSCULAR; INTRAVENOUS
Status: DISCONTINUED | OUTPATIENT
Start: 2018-04-30 | End: 2018-04-30 | Stop reason: HOSPADM

## 2018-04-30 RX ORDER — MORPHINE SULFATE 10 MG/ML
4 INJECTION INTRAMUSCULAR; INTRAVENOUS; SUBCUTANEOUS
Status: DISCONTINUED | OUTPATIENT
Start: 2018-04-30 | End: 2018-05-02 | Stop reason: HOSPADM

## 2018-04-30 RX ORDER — DEXAMETHASONE SODIUM PHOSPHATE 4 MG/ML
8 INJECTION, SOLUTION INTRA-ARTICULAR; INTRALESIONAL; INTRAMUSCULAR; INTRAVENOUS; SOFT TISSUE ONCE AS NEEDED
Status: COMPLETED | OUTPATIENT
Start: 2018-04-30 | End: 2018-04-30

## 2018-04-30 RX ORDER — FAMOTIDINE 20 MG/50ML
20 INJECTION, SOLUTION INTRAVENOUS EVERY 12 HOURS SCHEDULED
Status: DISCONTINUED | OUTPATIENT
Start: 2018-04-30 | End: 2018-04-30 | Stop reason: HOSPADM

## 2018-04-30 RX ORDER — BUPIVACAINE HYDROCHLORIDE AND EPINEPHRINE 2.5; 5 MG/ML; UG/ML
INJECTION, SOLUTION EPIDURAL; INFILTRATION; INTRACAUDAL; PERINEURAL AS NEEDED
Status: DISCONTINUED | OUTPATIENT
Start: 2018-04-30 | End: 2018-04-30 | Stop reason: HOSPADM

## 2018-04-30 RX ORDER — ONDANSETRON 2 MG/ML
4 INJECTION INTRAMUSCULAR; INTRAVENOUS EVERY 4 HOURS PRN
Status: DISCONTINUED | OUTPATIENT
Start: 2018-04-30 | End: 2018-05-02 | Stop reason: HOSPADM

## 2018-04-30 RX ORDER — MORPHINE SULFATE 2 MG/ML
2 INJECTION, SOLUTION INTRAMUSCULAR; INTRAVENOUS
Status: DISCONTINUED | OUTPATIENT
Start: 2018-04-30 | End: 2018-05-02 | Stop reason: HOSPADM

## 2018-04-30 RX ORDER — MIDAZOLAM HYDROCHLORIDE 1 MG/ML
2 INJECTION INTRAMUSCULAR; INTRAVENOUS
Status: DISCONTINUED | OUTPATIENT
Start: 2018-04-30 | End: 2018-04-30 | Stop reason: HOSPADM

## 2018-04-30 RX ORDER — SUCCINYLCHOLINE CHLORIDE 20 MG/ML
INJECTION INTRAMUSCULAR; INTRAVENOUS AS NEEDED
Status: DISCONTINUED | OUTPATIENT
Start: 2018-04-30 | End: 2018-04-30 | Stop reason: SURG

## 2018-04-30 RX ORDER — SODIUM CHLORIDE 0.9 % (FLUSH) 0.9 %
1-10 SYRINGE (ML) INJECTION AS NEEDED
Status: DISCONTINUED | OUTPATIENT
Start: 2018-04-30 | End: 2018-04-30 | Stop reason: HOSPADM

## 2018-04-30 RX ORDER — ONDANSETRON 2 MG/ML
4 INJECTION INTRAMUSCULAR; INTRAVENOUS ONCE
Status: COMPLETED | OUTPATIENT
Start: 2018-04-30 | End: 2018-04-30

## 2018-04-30 RX ORDER — LIDOCAINE HYDROCHLORIDE 20 MG/ML
INJECTION, SOLUTION INFILTRATION; PERINEURAL AS NEEDED
Status: DISCONTINUED | OUTPATIENT
Start: 2018-04-30 | End: 2018-04-30 | Stop reason: SURG

## 2018-04-30 RX ORDER — OXYCODONE HYDROCHLORIDE AND ACETAMINOPHEN 5; 325 MG/1; MG/1
1 TABLET ORAL EVERY 4 HOURS PRN
Status: DISCONTINUED | OUTPATIENT
Start: 2018-04-30 | End: 2018-05-02 | Stop reason: HOSPADM

## 2018-04-30 RX ORDER — SODIUM CHLORIDE, SODIUM LACTATE, POTASSIUM CHLORIDE, CALCIUM CHLORIDE 600; 310; 30; 20 MG/100ML; MG/100ML; MG/100ML; MG/100ML
125 INJECTION, SOLUTION INTRAVENOUS CONTINUOUS
Status: DISCONTINUED | OUTPATIENT
Start: 2018-04-30 | End: 2018-05-01

## 2018-04-30 RX ORDER — FENTANYL CITRATE 50 UG/ML
INJECTION, SOLUTION INTRAMUSCULAR; INTRAVENOUS AS NEEDED
Status: DISCONTINUED | OUTPATIENT
Start: 2018-04-30 | End: 2018-04-30 | Stop reason: SURG

## 2018-04-30 RX ORDER — SODIUM CHLORIDE, SODIUM LACTATE, POTASSIUM CHLORIDE, CALCIUM CHLORIDE 600; 310; 30; 20 MG/100ML; MG/100ML; MG/100ML; MG/100ML
100 INJECTION, SOLUTION INTRAVENOUS CONTINUOUS
Status: DISCONTINUED | OUTPATIENT
Start: 2018-04-30 | End: 2018-05-01

## 2018-04-30 RX ORDER — SODIUM CHLORIDE 9 MG/ML
40 INJECTION, SOLUTION INTRAVENOUS AS NEEDED
Status: DISCONTINUED | OUTPATIENT
Start: 2018-04-30 | End: 2018-04-30 | Stop reason: HOSPADM

## 2018-04-30 RX ORDER — CEFAZOLIN SODIUM 1 G/3ML
INJECTION, POWDER, FOR SOLUTION INTRAMUSCULAR; INTRAVENOUS
Status: DISPENSED
Start: 2018-04-30 | End: 2018-05-01

## 2018-04-30 RX ORDER — FAMOTIDINE 20 MG/1
20 TABLET, FILM COATED ORAL NIGHTLY PRN
Status: DISCONTINUED | OUTPATIENT
Start: 2018-04-30 | End: 2018-05-02 | Stop reason: HOSPADM

## 2018-04-30 RX ORDER — MULTIPLE VITAMINS W/ MINERALS TAB 9MG-400MCG
1 TAB ORAL DAILY
COMMUNITY
End: 2022-09-20

## 2018-04-30 RX ORDER — FAMOTIDINE 20 MG/1
20 TABLET, FILM COATED ORAL EVERY 12 HOURS SCHEDULED
Status: DISCONTINUED | OUTPATIENT
Start: 2018-04-30 | End: 2018-04-30 | Stop reason: HOSPADM

## 2018-04-30 RX ORDER — ASPIRIN 81 MG/1
81 TABLET, CHEWABLE ORAL DAILY
COMMUNITY
End: 2022-04-06

## 2018-04-30 RX ORDER — CHLORAL HYDRATE 500 MG
CAPSULE ORAL
COMMUNITY
End: 2022-09-20

## 2018-04-30 RX ORDER — GLYCOPYRROLATE 0.2 MG/ML
INJECTION INTRAMUSCULAR; INTRAVENOUS AS NEEDED
Status: DISCONTINUED | OUTPATIENT
Start: 2018-04-30 | End: 2018-04-30 | Stop reason: SURG

## 2018-04-30 RX ORDER — ONDANSETRON 2 MG/ML
4 INJECTION INTRAMUSCULAR; INTRAVENOUS ONCE AS NEEDED
Status: COMPLETED | OUTPATIENT
Start: 2018-04-30 | End: 2018-04-30

## 2018-04-30 RX ORDER — ONDANSETRON 2 MG/ML
4 INJECTION INTRAMUSCULAR; INTRAVENOUS ONCE AS NEEDED
Status: DISCONTINUED | OUTPATIENT
Start: 2018-04-30 | End: 2018-04-30 | Stop reason: HOSPADM

## 2018-04-30 RX ORDER — OXYCODONE HYDROCHLORIDE AND ACETAMINOPHEN 5; 325 MG/1; MG/1
2 TABLET ORAL EVERY 4 HOURS PRN
Status: DISCONTINUED | OUTPATIENT
Start: 2018-04-30 | End: 2018-05-02 | Stop reason: HOSPADM

## 2018-04-30 RX ORDER — ROCURONIUM BROMIDE 10 MG/ML
INJECTION, SOLUTION INTRAVENOUS AS NEEDED
Status: DISCONTINUED | OUTPATIENT
Start: 2018-04-30 | End: 2018-04-30 | Stop reason: SURG

## 2018-04-30 RX ORDER — PROPOFOL 10 MG/ML
VIAL (ML) INTRAVENOUS AS NEEDED
Status: DISCONTINUED | OUTPATIENT
Start: 2018-04-30 | End: 2018-04-30 | Stop reason: SURG

## 2018-04-30 RX ORDER — SODIUM CHLORIDE, SODIUM LACTATE, POTASSIUM CHLORIDE, CALCIUM CHLORIDE 600; 310; 30; 20 MG/100ML; MG/100ML; MG/100ML; MG/100ML
9 INJECTION, SOLUTION INTRAVENOUS CONTINUOUS
Status: DISCONTINUED | OUTPATIENT
Start: 2018-04-30 | End: 2018-04-30

## 2018-04-30 RX ORDER — LIDOCAINE HYDROCHLORIDE 10 MG/ML
0.5 INJECTION, SOLUTION EPIDURAL; INFILTRATION; INTRACAUDAL; PERINEURAL ONCE AS NEEDED
Status: DISCONTINUED | OUTPATIENT
Start: 2018-04-30 | End: 2018-04-30 | Stop reason: HOSPADM

## 2018-04-30 RX ADMIN — METRONIDAZOLE 500 MG: 500 INJECTION, SOLUTION INTRAVENOUS at 22:26

## 2018-04-30 RX ADMIN — MORPHINE SULFATE 2 MG: 10 INJECTION INTRAVENOUS at 20:31

## 2018-04-30 RX ADMIN — HYDROMORPHONE HYDROCHLORIDE 0.5 MG: 2 INJECTION INTRAMUSCULAR; INTRAVENOUS; SUBCUTANEOUS at 12:41

## 2018-04-30 RX ADMIN — OXYCODONE HYDROCHLORIDE AND ACETAMINOPHEN 2 TABLET: 5; 325 TABLET ORAL at 22:26

## 2018-04-30 RX ADMIN — METRONIDAZOLE 500 MG: 500 INJECTION, SOLUTION INTRAVENOUS at 14:04

## 2018-04-30 RX ADMIN — GLYCOPYRROLATE 0.6 MG: 0.2 INJECTION INTRAMUSCULAR; INTRAVENOUS at 17:00

## 2018-04-30 RX ADMIN — ROCURONIUM BROMIDE 25 MG: 10 INJECTION INTRAVENOUS at 15:32

## 2018-04-30 RX ADMIN — ROCURONIUM BROMIDE 10 MG: 10 INJECTION INTRAVENOUS at 16:26

## 2018-04-30 RX ADMIN — ROCURONIUM BROMIDE 5 MG: 10 INJECTION INTRAVENOUS at 15:21

## 2018-04-30 RX ADMIN — PROPOFOL 200 MG: 10 INJECTION, EMULSION INTRAVENOUS at 15:24

## 2018-04-30 RX ADMIN — SUCCINYLCHOLINE CHLORIDE 200 MG: 20 INJECTION, SOLUTION INTRAMUSCULAR; INTRAVENOUS at 15:24

## 2018-04-30 RX ADMIN — ONDANSETRON 4 MG: 2 INJECTION, SOLUTION INTRAMUSCULAR; INTRAVENOUS at 15:09

## 2018-04-30 RX ADMIN — NEOSTIGMINE METHYLSULFATE 4 MG: 1 INJECTION, SOLUTION INTRAMUSCULAR; INTRAVENOUS; SUBCUTANEOUS at 17:00

## 2018-04-30 RX ADMIN — SODIUM CHLORIDE 1000 ML: 9 INJECTION, SOLUTION INTRAVENOUS at 11:52

## 2018-04-30 RX ADMIN — LIDOCAINE HYDROCHLORIDE 100 MG: 20 INJECTION, SOLUTION INFILTRATION; PERINEURAL at 15:21

## 2018-04-30 RX ADMIN — MIDAZOLAM HYDROCHLORIDE 1 MG: 1 INJECTION, SOLUTION INTRAMUSCULAR; INTRAVENOUS at 15:09

## 2018-04-30 RX ADMIN — FENTANYL CITRATE 50 MCG: 50 INJECTION, SOLUTION INTRAMUSCULAR; INTRAVENOUS at 17:00

## 2018-04-30 RX ADMIN — FENTANYL CITRATE 100 MCG: 50 INJECTION, SOLUTION INTRAMUSCULAR; INTRAVENOUS at 15:21

## 2018-04-30 RX ADMIN — ONDANSETRON 4 MG: 2 INJECTION, SOLUTION INTRAMUSCULAR; INTRAVENOUS at 11:52

## 2018-04-30 RX ADMIN — DEXAMETHASONE SODIUM PHOSPHATE 8 MG: 4 INJECTION, SOLUTION INTRAMUSCULAR; INTRAVENOUS at 15:09

## 2018-04-30 RX ADMIN — FAMOTIDINE 20 MG: 20 INJECTION, SOLUTION INTRAVENOUS at 15:09

## 2018-04-30 RX ADMIN — FENTANYL CITRATE 50 MCG: 50 INJECTION, SOLUTION INTRAMUSCULAR; INTRAVENOUS at 16:45

## 2018-04-30 RX ADMIN — CEFAZOLIN 3 G: 1 INJECTION, POWDER, FOR SOLUTION INTRAMUSCULAR; INTRAVENOUS at 15:29

## 2018-04-30 RX ADMIN — IOPAMIDOL 100 ML: 755 INJECTION, SOLUTION INTRAVENOUS at 12:53

## 2018-04-30 RX ADMIN — CEFAZOLIN 3 G: 1 INJECTION, POWDER, FOR SOLUTION INTRAMUSCULAR; INTRAVENOUS at 23:46

## 2018-04-30 RX ADMIN — ROCURONIUM BROMIDE 10 MG: 10 INJECTION INTRAVENOUS at 16:02

## 2018-04-30 RX ADMIN — OXYCODONE HYDROCHLORIDE AND ACETAMINOPHEN 1 TABLET: 5; 325 TABLET ORAL at 18:20

## 2018-04-30 RX ADMIN — HYDROMORPHONE HYDROCHLORIDE 0.5 MG: 1 INJECTION, SOLUTION INTRAMUSCULAR; INTRAVENOUS; SUBCUTANEOUS at 17:43

## 2018-04-30 RX ADMIN — HYDROMORPHONE HYDROCHLORIDE 0.5 MG: 2 INJECTION INTRAMUSCULAR; INTRAVENOUS; SUBCUTANEOUS at 11:52

## 2018-04-30 RX ADMIN — SODIUM CHLORIDE, POTASSIUM CHLORIDE, SODIUM LACTATE AND CALCIUM CHLORIDE 9 ML/HR: 600; 310; 30; 20 INJECTION, SOLUTION INTRAVENOUS at 15:00

## 2018-04-30 NOTE — ANESTHESIA POSTPROCEDURE EVALUATION
Patient: Fran Houser    Procedure Summary     Date:  04/30/18 Room / Location:   [facility] /  [facility]    Anesthesia Start:  1517 Anesthesia Stop:  1717    Procedure:  APPENDECTOMY LAPAROSCOPIC (N/A Abdomen) Diagnosis:       Acute appendicitis with localized peritonitis      (Acute appendicitis with localized peritonitis [K35.3])    Surgeon:  Alex Kuo MD Provider:  Fay Daniel CRNA    Anesthesia Type:  general ASA Status:  2 - Emergent          Anesthesia Type: general  Last vitals  BP   133/71 (04/30/18 1732)   Temp   98 °F (36.7 °C) (04/30/18 1710)   Pulse   (!) 45 (04/30/18 1732)   Resp   15 (04/30/18 1732)     SpO2   95 % (04/30/18 1732)     Post Anesthesia Care and Evaluation    Patient location during evaluation: bedside  Patient participation: complete - patient participated  Level of consciousness: awake  Pain management: adequate  Airway patency: patent  Anesthetic complications: No anesthetic complications  PONV Status: none  Cardiovascular status: acceptable  Respiratory status: acceptable  Hydration status: acceptable

## 2018-04-30 NOTE — ANESTHESIA PROCEDURE NOTES
Airway  Urgency: elective    Date/Time: 4/30/2018 3:25 PM  End Time:4/30/2018 3:25 PM    General Information and Staff    Patient location during procedure: OR  CRNA: VENITA DÍAZ    Indications and Patient Condition  Indications for airway management: airway protection    Preoxygenated: yes  MILS maintained throughout  Mask difficulty assessment: 0 - not attempted (RSI)    Final Airway Details  Final airway type: endotracheal airway      Successful airway: ETT  Cuffed: yes   Successful intubation technique: direct laryngoscopy  Facilitating devices/methods: intubating stylet and cricoid pressure  Endotracheal tube insertion site: oral  Blade: Stover  Blade size: #2  ETT size: 7.5 mm  Cormack-Lehane Classification: grade IIa - partial view of glottis  Placement verified by: chest auscultation and capnometry   Measured from: lips  ETT to lips (cm): 23  Number of attempts at approach: 1    Additional Comments  BEBS, +ETCO2, VSS. TEETH AND GUMS AS PRE-OP.

## 2018-04-30 NOTE — ANESTHESIA PREPROCEDURE EVALUATION
Anesthesia Evaluation     Patient summary reviewed and Nursing notes reviewed   no history of anesthetic complications:  NPO Solid Status: > 8 hours  NPO Liquid Status: > 4 hours           Airway   Mallampati: III  TM distance: >3 FB  Neck ROM: full  Possible difficult intubation  Dental - normal exam     Pulmonary - normal exam    breath sounds clear to auscultation  (+) sleep apnea ( not diagnosed but significant snoring),   Cardiovascular - negative cardio ROS and normal exam  Exercise tolerance: good (4-7 METS)    Rhythm: regular  Rate: normal        Neuro/Psych- negative ROS  GI/Hepatic/Renal/Endo    (+) obesity,       Musculoskeletal (-) negative ROS    Abdominal   (+) obese,    Substance History - negative use     OB/GYN negative ob/gyn ROS         Other - negative ROS       ROS/Med Hx Other: Sips of water at 1000    SINUS RHYTHM  $(QMBW) . BASELINE WANDER IN LEAD(S) I II aVR V4                Anesthesia Plan    ASA 2 - emergent     general     intravenous induction   Anesthetic plan and risks discussed with patient.  Use of blood products discussed with patient  Consented to blood products.

## 2018-05-01 PROCEDURE — G0378 HOSPITAL OBSERVATION PER HR: HCPCS

## 2018-05-01 PROCEDURE — 25010000003 CEFAZOLIN PER 500 MG: Performed by: SURGERY

## 2018-05-01 PROCEDURE — 99024 POSTOP FOLLOW-UP VISIT: CPT | Performed by: SURGERY

## 2018-05-01 PROCEDURE — A9270 NON-COVERED ITEM OR SERVICE: HCPCS | Performed by: SURGERY

## 2018-05-01 PROCEDURE — 25010000002 MORPHINE PER 10 MG: Performed by: SURGERY

## 2018-05-01 PROCEDURE — 63710000001 OXYCODONE-ACETAMINOPHEN 5-325 MG TABLET: Performed by: SURGERY

## 2018-05-01 RX ADMIN — METRONIDAZOLE 500 MG: 500 INJECTION, SOLUTION INTRAVENOUS at 22:19

## 2018-05-01 RX ADMIN — MORPHINE SULFATE 4 MG: 10 INJECTION INTRAVENOUS at 23:29

## 2018-05-01 RX ADMIN — MORPHINE SULFATE 4 MG: 10 INJECTION INTRAVENOUS at 04:05

## 2018-05-01 RX ADMIN — MORPHINE SULFATE 4 MG: 10 INJECTION INTRAVENOUS at 00:22

## 2018-05-01 RX ADMIN — METRONIDAZOLE 500 MG: 500 INJECTION, SOLUTION INTRAVENOUS at 05:29

## 2018-05-01 RX ADMIN — CEFAZOLIN 3 G: 1 INJECTION, POWDER, FOR SOLUTION INTRAMUSCULAR; INTRAVENOUS at 15:38

## 2018-05-01 RX ADMIN — MORPHINE SULFATE 4 MG: 10 INJECTION INTRAVENOUS at 08:11

## 2018-05-01 RX ADMIN — MORPHINE SULFATE 4 MG: 10 INJECTION INTRAVENOUS at 19:04

## 2018-05-01 RX ADMIN — CEFAZOLIN 3 G: 1 INJECTION, POWDER, FOR SOLUTION INTRAMUSCULAR; INTRAVENOUS at 23:29

## 2018-05-01 RX ADMIN — OXYCODONE HYDROCHLORIDE AND ACETAMINOPHEN 2 TABLET: 5; 325 TABLET ORAL at 22:19

## 2018-05-01 RX ADMIN — METRONIDAZOLE 500 MG: 500 INJECTION, SOLUTION INTRAVENOUS at 13:56

## 2018-05-01 RX ADMIN — CEFAZOLIN 3 G: 1 INJECTION, POWDER, FOR SOLUTION INTRAMUSCULAR; INTRAVENOUS at 06:30

## 2018-05-01 RX ADMIN — OXYCODONE HYDROCHLORIDE AND ACETAMINOPHEN 2 TABLET: 5; 325 TABLET ORAL at 06:29

## 2018-05-01 RX ADMIN — MORPHINE SULFATE 4 MG: 10 INJECTION INTRAVENOUS at 13:41

## 2018-05-01 RX ADMIN — OXYCODONE HYDROCHLORIDE AND ACETAMINOPHEN 2 TABLET: 5; 325 TABLET ORAL at 10:58

## 2018-05-01 NOTE — ACP (ADVANCE CARE PLANNING)
Spoke with patient and his wife concerning ACP.  Patient was not feeling well.  I will return on Wednesday, 5/2.    Татьяна Solorio

## 2018-05-01 NOTE — PROGRESS NOTES
Afebrile vital signs are stable.  He is without complaints.  He has only been on clear liquids thus far.  His Minesh-Alan drain is putting out serosanguineous returns.  His operation was discussed.  We will continue his intravenous antibiotics because of the free pus in his pelvis.  We will teach the patient and his wife how to take care of the drain.  He's been encouraged to ambulate and we will advance his diet.

## 2018-05-01 NOTE — NURSING NOTE
Education on drain care provided with patient and spouse, receptive to learning and verbalized understanding of teaching.

## 2018-05-01 NOTE — PLAN OF CARE
Problem: Patient Care Overview  Goal: Plan of Care Review  Outcome: Ongoing (interventions implemented as appropriate)   05/01/18 0320   Coping/Psychosocial   Plan of Care Reviewed With patient   Plan of Care Review   Progress improving   OTHER   Outcome Summary Patient VSS. Pt HR remains ty (same pre-op). Drsg to abd CDI. KENNEY drain present with small amount of sanguinous drainage. Pain controlled with IV and oral pain medication. Will continue to monitor patient.      Goal: Individualization and Mutuality  Outcome: Ongoing (interventions implemented as appropriate)    Goal: Discharge Needs Assessment  Outcome: Ongoing (interventions implemented as appropriate)    Goal: Interprofessional Rounds/Family Conf  Outcome: Ongoing (interventions implemented as appropriate)      Problem: Pain, Acute (Adult)  Goal: Identify Related Risk Factors and Signs and Symptoms  Outcome: Ongoing (interventions implemented as appropriate)    Goal: Acceptable Pain Control/Comfort Level  Outcome: Ongoing (interventions implemented as appropriate)

## 2018-05-02 VITALS
TEMPERATURE: 98.5 F | OXYGEN SATURATION: 97 % | DIASTOLIC BLOOD PRESSURE: 79 MMHG | RESPIRATION RATE: 17 BRPM | HEIGHT: 73 IN | BODY MASS INDEX: 39.49 KG/M2 | SYSTOLIC BLOOD PRESSURE: 126 MMHG | WEIGHT: 298 LBS | HEART RATE: 57 BPM

## 2018-05-02 LAB
BASOPHILS # BLD AUTO: 0.03 10*3/MM3 (ref 0–0.2)
BASOPHILS NFR BLD AUTO: 0.3 % (ref 0–2)
DEPRECATED RDW RBC AUTO: 44.7 FL (ref 37–54)
EOSINOPHIL # BLD AUTO: 0.13 10*3/MM3 (ref 0.1–0.3)
EOSINOPHIL NFR BLD AUTO: 1.4 % (ref 0–4)
ERYTHROCYTE [DISTWIDTH] IN BLOOD BY AUTOMATED COUNT: 12.9 % (ref 11.5–14.5)
HCT VFR BLD AUTO: 39.1 % (ref 42–52)
HGB BLD-MCNC: 13.3 G/DL (ref 14–18)
IMM GRANULOCYTES # BLD: 0.03 10*3/MM3 (ref 0–0.03)
IMM GRANULOCYTES NFR BLD: 0.3 % (ref 0–0.5)
LYMPHOCYTES # BLD AUTO: 1.82 10*3/MM3 (ref 0.6–4.8)
LYMPHOCYTES NFR BLD AUTO: 18.9 % (ref 20–45)
MCH RBC QN AUTO: 32 PG (ref 27–31)
MCHC RBC AUTO-ENTMCNC: 34 G/DL (ref 31–37)
MCV RBC AUTO: 94 FL (ref 80–94)
MONOCYTES # BLD AUTO: 0.99 10*3/MM3 (ref 0–1)
MONOCYTES NFR BLD AUTO: 10.3 % (ref 3–8)
NEUTROPHILS # BLD AUTO: 6.62 10*3/MM3 (ref 1.5–8.3)
NEUTROPHILS NFR BLD AUTO: 68.8 % (ref 45–70)
NRBC BLD MANUAL-RTO: 0 /100 WBC (ref 0–0)
PLATELET # BLD AUTO: 241 10*3/MM3 (ref 140–500)
PMV BLD AUTO: 9.7 FL (ref 7.4–10.4)
RBC # BLD AUTO: 4.16 10*6/MM3 (ref 4.7–6.1)
WBC NRBC COR # BLD: 9.62 10*3/MM3 (ref 4.8–10.8)

## 2018-05-02 PROCEDURE — A9270 NON-COVERED ITEM OR SERVICE: HCPCS | Performed by: SURGERY

## 2018-05-02 PROCEDURE — 99024 POSTOP FOLLOW-UP VISIT: CPT | Performed by: SURGERY

## 2018-05-02 PROCEDURE — 85025 COMPLETE CBC W/AUTO DIFF WBC: CPT | Performed by: SURGERY

## 2018-05-02 PROCEDURE — G0378 HOSPITAL OBSERVATION PER HR: HCPCS

## 2018-05-02 PROCEDURE — 25010000003 CEFAZOLIN PER 500 MG: Performed by: SURGERY

## 2018-05-02 PROCEDURE — 63710000001 OXYCODONE-ACETAMINOPHEN 5-325 MG TABLET: Performed by: SURGERY

## 2018-05-02 RX ORDER — METRONIDAZOLE 500 MG/1
500 TABLET ORAL EVERY 8 HOURS SCHEDULED
Qty: 15 TABLET | Refills: 0 | Status: SHIPPED | OUTPATIENT
Start: 2018-05-02 | End: 2018-05-07

## 2018-05-02 RX ORDER — CEPHALEXIN 500 MG/1
500 CAPSULE ORAL EVERY 8 HOURS SCHEDULED
Status: DISCONTINUED | OUTPATIENT
Start: 2018-05-02 | End: 2018-05-02 | Stop reason: HOSPADM

## 2018-05-02 RX ORDER — CEPHALEXIN 500 MG/1
500 CAPSULE ORAL EVERY 8 HOURS SCHEDULED
Qty: 15 CAPSULE | Refills: 0 | Status: SHIPPED | OUTPATIENT
Start: 2018-05-02 | End: 2018-05-07

## 2018-05-02 RX ORDER — METRONIDAZOLE 500 MG/1
500 TABLET ORAL EVERY 8 HOURS SCHEDULED
Status: DISCONTINUED | OUTPATIENT
Start: 2018-05-02 | End: 2018-05-02 | Stop reason: HOSPADM

## 2018-05-02 RX ORDER — OXYCODONE HYDROCHLORIDE AND ACETAMINOPHEN 5; 325 MG/1; MG/1
1-2 TABLET ORAL EVERY 4 HOURS PRN
Qty: 30 TABLET | Refills: 0 | Status: SHIPPED | OUTPATIENT
Start: 2018-05-02 | End: 2018-06-11

## 2018-05-02 RX ADMIN — OXYCODONE HYDROCHLORIDE AND ACETAMINOPHEN 2 TABLET: 5; 325 TABLET ORAL at 06:02

## 2018-05-02 RX ADMIN — METRONIDAZOLE 500 MG: 500 INJECTION, SOLUTION INTRAVENOUS at 06:02

## 2018-05-02 RX ADMIN — OXYCODONE HYDROCHLORIDE AND ACETAMINOPHEN 2 TABLET: 5; 325 TABLET ORAL at 10:08

## 2018-05-02 RX ADMIN — CEFAZOLIN 3 G: 1 INJECTION, POWDER, FOR SOLUTION INTRAMUSCULAR; INTRAVENOUS at 08:01

## 2018-05-02 NOTE — PLAN OF CARE
Problem: Patient Care Overview  Goal: Plan of Care Review  Outcome: Ongoing (interventions implemented as appropriate)   05/02/18 0312   Coping/Psychosocial   Plan of Care Reviewed With patient   Plan of Care Review   Progress improving   OTHER   Outcome Summary Pt VSS. HR remains ty. Drsg to abd CDI. KENNEY drain present with small amount of sanguinous drainage. Pain controlled with IV and oral pain medication. Will continue to monitor patient.     Goal: Individualization and Mutuality  Outcome: Ongoing (interventions implemented as appropriate)    Goal: Discharge Needs Assessment  Outcome: Ongoing (interventions implemented as appropriate)    Goal: Interprofessional Rounds/Family Conf  Outcome: Ongoing (interventions implemented as appropriate)      Problem: Pain, Acute (Adult)  Goal: Identify Related Risk Factors and Signs and Symptoms  Outcome: Ongoing (interventions implemented as appropriate)    Goal: Acceptable Pain Control/Comfort Level  Outcome: Ongoing (interventions implemented as appropriate)

## 2018-05-02 NOTE — PLAN OF CARE
Problem: Patient Care Overview  Goal: Plan of Care Review  Outcome: Ongoing (interventions implemented as appropriate)   05/02/18 0958   Coping/Psychosocial   Plan of Care Reviewed With patient   OTHER   Outcome Summary Pt VSS, continues on iv abt, tolerates well, up ambulating in rey with sba, pain well controlled with current pain med regimen. No s/s of acute distress noted. Will continue to monitor.     Goal: Individualization and Mutuality  Outcome: Ongoing (interventions implemented as appropriate)    Goal: Discharge Needs Assessment  Outcome: Ongoing (interventions implemented as appropriate)    Goal: Interprofessional Rounds/Family Conf  Outcome: Ongoing (interventions implemented as appropriate)      Problem: Pain, Acute (Adult)  Goal: Identify Related Risk Factors and Signs and Symptoms  Outcome: Outcome(s) achieved Date Met: 05/02/18    Goal: Acceptable Pain Control/Comfort Level  Outcome: Ongoing (interventions implemented as appropriate)

## 2018-05-02 NOTE — PROGRESS NOTES
He feels well.  He is tolerating regular diet.  He's afebrile.  His white blood count is decreasing.  His Minesh-Alan drain is putting out serous returns.  He is up and about.  He will be switched to oral antibiotics and discharged home today.  He can take care of his own drain.  I will see him in the office on monday

## 2018-05-03 LAB
LAB AP CASE REPORT: NORMAL
LAB AP CLINICAL INFORMATION: NORMAL
Lab: NORMAL
PATH REPORT.FINAL DX SPEC: NORMAL

## 2018-05-04 LAB
BACTERIA SPEC AEROBE CULT: NORMAL
GRAM STN SPEC: NORMAL
GRAM STN SPEC: NORMAL

## 2018-05-05 LAB — BACTERIA SPEC ANAEROBE CULT: NORMAL

## 2018-05-07 ENCOUNTER — OFFICE VISIT (OUTPATIENT)
Dept: SURGERY | Facility: CLINIC | Age: 53
End: 2018-05-07

## 2018-05-07 DIAGNOSIS — Z09 SURGICAL FOLLOW-UP CARE: Primary | ICD-10-CM

## 2018-05-07 PROCEDURE — 99024 POSTOP FOLLOW-UP VISIT: CPT | Performed by: SURGERY

## 2018-05-07 NOTE — PROGRESS NOTES
1 WK S/P LAP APPY, NO COMPLAINTS, HAS COMPLETED ABX  He has completed his antibiotics.  He is eating well he denies any fevers or chills.  His Minesh-Alan drain is putting out serous returns and was removed.  One half of his staples were removed.  Activity level was discussed.  His pathology was discussed.  I will see him back in 1 week.

## 2018-05-14 ENCOUNTER — OFFICE VISIT (OUTPATIENT)
Dept: SURGERY | Facility: CLINIC | Age: 53
End: 2018-05-14

## 2018-05-14 DIAGNOSIS — Z09 SURGICAL FOLLOW-UP CARE: Primary | ICD-10-CM

## 2018-05-14 PROCEDURE — 99024 POSTOP FOLLOW-UP VISIT: CPT | Performed by: SURGERY

## 2018-05-14 NOTE — PROGRESS NOTES
2 week s/p appy; having no current sx  He is without complaints his remaining staples were removed.  There is no impulse.  I will see him back in 1 month.  Activity level was discussed.  I also discussed the has bilateral small indirect inguinal hernias.  The risks benefits and options were discussed with he and his wife in detail.

## 2018-06-05 ENCOUNTER — OFFICE VISIT (OUTPATIENT)
Dept: SLEEP MEDICINE | Facility: HOSPITAL | Age: 53
End: 2018-06-05
Attending: INTERNAL MEDICINE

## 2018-06-05 VITALS
DIASTOLIC BLOOD PRESSURE: 78 MMHG | HEIGHT: 72 IN | WEIGHT: 298 LBS | HEART RATE: 68 BPM | SYSTOLIC BLOOD PRESSURE: 137 MMHG | BODY MASS INDEX: 40.36 KG/M2

## 2018-06-05 DIAGNOSIS — R06.83 SNORING: ICD-10-CM

## 2018-06-05 DIAGNOSIS — R06.81 APNEA: ICD-10-CM

## 2018-06-05 DIAGNOSIS — G47.10 HYPERSOMNOLENCE: ICD-10-CM

## 2018-06-05 DIAGNOSIS — G47.33 OSA (OBSTRUCTIVE SLEEP APNEA): Primary | ICD-10-CM

## 2018-06-05 DIAGNOSIS — E66.01 OBESITY, MORBID, BMI 40.0-49.9 (HCC): ICD-10-CM

## 2018-06-05 PROCEDURE — G0463 HOSPITAL OUTPT CLINIC VISIT: HCPCS

## 2018-06-05 NOTE — PROGRESS NOTES
[facility] SLEEP MEDICINE  [address]  3rd Floor  [facility] [address]  [phone]    Referring Physician: Isaiah Tse MD  PCP: Isaiah Tse III, MD    Reason for consult:  Sleep disorders of snoring and apnes    Fran Houser is a 52 y.o.male was seen in the [facility] today. He reports increased snoring and apneas per his wife within past 1 year. He had HST 6 years ago - no evidence CAROLYN. He sleeps from 12MN to 8AM. He feels somewhat groggy when he awakens. He is somewhat refreshed. He feels somewhat sleepy during day, denies drowsiness with driving. He has gained 50lbs over past 5 years. He has frequent awakenings at night. He is not on Percocet per patient.    [place] Sleepiness Score: 6. Caffeine intake 2 per day. Alcohol intake 1 per week.    Fran Houser  has no past medical history on file. AR    Current Medications:    Current Outpatient Prescriptions:   •  aspirin 81 MG chewable tablet, Chew 81 mg Daily., Disp: , Rfl:   •  Multiple Vitamins-Minerals (MULTIVITAMIN WITH MINERALS) tablet tablet, Take 1 tablet by mouth Daily., Disp: , Rfl:   •  Omega-3 Fatty Acids (FISH OIL) 1000 MG capsule capsule, Take  by mouth Daily With Breakfast., Disp: , Rfl:   •  oxyCODONE-acetaminophen (PERCOCET) 5-325 MG per tablet, Take 1-2 tablets by mouth Every 4 (Four) Hours As Needed (pain) for up to 30 doses., Disp: 30 tablet, Rfl: 0   also listed in Sleep Questionnaire.    FH: family history includes Diabetes in his maternal grandfather and mother; Heart attack in his father; Heart disease in his father; Hypertension in his mother; Thyroid disease in his mother.  SH:  reports that he has never smoked. He has never used smokeless tobacco. He reports that he drinks alcohol. He reports that he does not use drugs.    Pertinent Positive Review of Systems of recurrent nose bleed, nasal congestion  Rest of Review of Systems was negative as recorded in Sleep Questionnaire.        Vital Signs: /78  "  Pulse 68   Ht 181.6 cm (71.5\")   Wt 135 kg (298 lb)   BMI 40.98 kg/m²     Body mass index is 40.98 kg/m².       Tongue: large      Dentition: good       Pharynx: Posterior pharyngeal pillars are narrow   Mallampatti: III (soft and hard palate and base of uvula visible)        General: Alert. Cooperative. Well developed. No acute distress.             Head:  Normocephalic. Symmetrical. Atraumatic.              Eyes: Sclera clear. No icterus. PERRLA. Normal EOM.             Ears: No deformities. Normal hearing.             Nose: No septal deviation. No drainage.          Throat: No oral lesions. No thrush. Moist mucous membranes.    Chest Wall:  Normal shape. Symmetric expansion with respiration. No tenderness.             Neck:  Trachea midline.           Lungs:  Clear to auscultation bilaterally. No wheezes. No rhonchi. No rales. Respirations regular, even and unlabored.            Heart:  Regular rhythm and normal rate. Normal S1 and S2. No murmur.     Abdomen:  Soft, non-tender and non-distended. Normal bowel sounds. No masses.  Extremities:  Moves all extremities well. No edema.           Pulses: Pulses palpable and equal bilaterally.               Skin: Dry. Intact. No bleeding. No rash.           Neuro: Moves all 4 extremities and cranial nerves grossly intact.  Psychiatric: Normal mood and affect.    Impression:  1. CAROLYN (obstructive sleep apnea)    2. Hypersomnolence    3. Snoring    4. Apnea    5. Obesity, morbid, BMI 40.0-49.9        Plan:  Fran has typical risk factors for sleep apnea.  Since his previous HST he has gained 50 pounds.  This likely explains increase in symptoms.  I will schedule him for another HST.  I explained to him insurance usually requires this to be done prior to and in lab test.  If the HST fails to reveal an answer he will require an in lab study.  Patient was asked to sleep on his back as much as possible for HST.  IV briefly explained the various treatments for obstructive " sleep apnea.  He very likely will require treatment with a CPAP machine.  Weight loss may help in reducing severity of underlying sleep-disordered breathing.  Cardiovascular health risks of untreated sleep apnea were emphasized.  He does report occasional nosebleeds.  This could indicate some rhinitis issues.  He will need to be cautious with use of CPAP.  He may require adjustment of humidification to optimize.  I also asked him to consider a nasal steroid spray different than Flonase.    This patient has typical features of obstructive sleep apnea with hypersomnolence snoring and apneas along with elevated BMI and classic oropharyngeal structure.  Likelihood of obstructive sleep apnea is high and a polysomnogram study will therefore be requested.      Possible diagnosis and pathophysiology of obstructive sleep apnea was discussed with the patient.  Health risks of untreated obstructive sleep apnea including cardiovascular risks were discussed.  Patient was cautioned about activities requiring full concentration especially driving at night or for longer distances, and until his hypersomnolence is corrected.    Results of sleep testing will be reviewed to see if patient is a candidate for CPAP machine.  Alternatives to therapy include oral mandibular advancing device (OMAD) were discussed. However OMAD is usually only beneficial in mild obstructive sleep apnea.  The benefit of weight loss in reducing severity of obstructive sleep apnea was discussed.  Patient would benefit from adhering to a strict diet to achieve ideal BMI.     Patient will follow up in this clinic after testing.    Thank you for allowing me to participate in your patient's care.    Hermilo Cordon MD    Part of this note may be an electronic transcription/translation of spoken language to printed text using the Dragon Dictation System.

## 2018-06-11 ENCOUNTER — OFFICE VISIT (OUTPATIENT)
Dept: SURGERY | Facility: CLINIC | Age: 53
End: 2018-06-11

## 2018-06-11 DIAGNOSIS — Z09 SURGICAL FOLLOW-UP CARE: Primary | ICD-10-CM

## 2018-06-11 PROCEDURE — 99024 POSTOP FOLLOW-UP VISIT: CPT | Performed by: SURGERY

## 2018-06-11 NOTE — PROGRESS NOTES
6 wks s/p lap appy, some sensitivity when picking up things over 5 pounds but otherwise well   He says he feels some pulling in his incision when he does lifting.  His bowel movements are normal,is eating well.  He denies fevers or chills.  His incisions are healing well.  There is no impulse.  He can resume normal activities and I will see him back when necessary.

## 2018-06-12 ENCOUNTER — HOSPITAL ENCOUNTER (OUTPATIENT)
Dept: SLEEP MEDICINE | Facility: HOSPITAL | Age: 53
Discharge: HOME | End: 2018-06-12
Attending: INTERNAL MEDICINE

## 2018-06-12 DIAGNOSIS — G47.33 OSA (OBSTRUCTIVE SLEEP APNEA): ICD-10-CM

## 2018-07-12 ENCOUNTER — TRANSCRIBE ORDERS (OUTPATIENT)
Dept: SLEEP MEDICINE | Facility: HOSPITAL | Age: 53
End: 2018-07-12

## 2018-07-12 ENCOUNTER — HOSPITAL ENCOUNTER (OUTPATIENT)
Dept: SLEEP MEDICINE | Facility: HOSPITAL | Age: 53
Discharge: HOME | End: 2018-07-12
Attending: INTERNAL MEDICINE | Admitting: INTERNAL MEDICINE

## 2018-07-12 DIAGNOSIS — G47.33 OSA (OBSTRUCTIVE SLEEP APNEA): Primary | ICD-10-CM

## 2018-07-12 DIAGNOSIS — G47.33 OSA (OBSTRUCTIVE SLEEP APNEA): ICD-10-CM

## 2018-07-12 PROCEDURE — 95806 SLEEP STUDY UNATT&RESP EFFT: CPT

## 2018-07-18 ENCOUNTER — TELEPHONE (OUTPATIENT)
Dept: SLEEP MEDICINE | Facility: HOSPITAL | Age: 53
End: 2018-07-18

## 2018-07-18 NOTE — TELEPHONE ENCOUNTER
Tech called home/mobile #, no answer Tech left vm informing pt study results in. Sent DME set up to [facility] and scheduled F/U appt. Pt to return call. MAB

## 2018-07-18 NOTE — TELEPHONE ENCOUNTER
Pt called in and went over study results confirmed DME of [facility] and confirmed F/U appt date and time. MAB

## 2018-08-23 ENCOUNTER — TELEPHONE (OUTPATIENT)
Dept: SLEEP MEDICINE | Facility: HOSPITAL | Age: 53
End: 2018-08-23

## 2018-08-23 NOTE — TELEPHONE ENCOUNTER
Tech called pts mobile # no answer then called pts emergency contact and spoke with them in regards to a better contact # did not have anything different to reach pt on. Pt has yet to be set up with pap equipment per [facility] as they can not reach pt. Per emergency contact pt is wearing a pap therapy device. Tech called [facility] to see if they were able to get pt set up. Waiting for response. MAB

## 2018-11-06 ENCOUNTER — OFFICE VISIT (OUTPATIENT)
Dept: SLEEP MEDICINE | Facility: HOSPITAL | Age: 53
End: 2018-11-06
Attending: INTERNAL MEDICINE

## 2018-11-06 ENCOUNTER — LAB (OUTPATIENT)
Dept: LAB | Facility: HOSPITAL | Age: 53
End: 2018-11-06
Attending: INTERNAL MEDICINE

## 2018-11-06 VITALS
DIASTOLIC BLOOD PRESSURE: 74 MMHG | HEIGHT: 73 IN | SYSTOLIC BLOOD PRESSURE: 144 MMHG | BODY MASS INDEX: 41.22 KG/M2 | HEART RATE: 62 BPM | WEIGHT: 311 LBS

## 2018-11-06 DIAGNOSIS — R53.82 CHRONIC FATIGUE: ICD-10-CM

## 2018-11-06 DIAGNOSIS — J30.89 NON-SEASONAL ALLERGIC RHINITIS, UNSPECIFIED TRIGGER: ICD-10-CM

## 2018-11-06 DIAGNOSIS — G47.33 OBSTRUCTIVE SLEEP APNEA, ADULT: Primary | ICD-10-CM

## 2018-11-06 PROCEDURE — 36415 COLL VENOUS BLD VENIPUNCTURE: CPT

## 2018-11-06 PROCEDURE — 84403 ASSAY OF TOTAL TESTOSTERONE: CPT

## 2018-11-06 PROCEDURE — G0463 HOSPITAL OUTPT CLINIC VISIT: HCPCS

## 2018-11-06 PROCEDURE — 84402 ASSAY OF FREE TESTOSTERONE: CPT

## 2018-11-06 NOTE — PROGRESS NOTES
PULMONARY SLEEP CONSULT NOTE      PATIENT IDENTIFICATION:  Name: Fran Houser  Age: 53 y.o.  Sex: male  :  1965  MRN: [id number]    DATE OF CONSULTATION:  2018   Referring Physician: No admitting provider for patient encounter.                  CHIEF COMPLAINT: Obstructive Sleep Apnea    History of Present Illness:   Fran Houser is a 53 y.o. male Pt on CPAP feeling better more energy especially the night he use it more than 4 hours, no sleepiness no fatigue no tiredness, no mask irritation no dryness, compliance report reviewed with pt AHI< 5 with good usage.   Still not up on his energy and sexual drive      Review of Systems:   Constitutional: negative   Eyes: negative   ENT/oropharynx: negative   Cardiovascular: negative   Respiratory: negative   Gastrointestinal: negative   Genitourinary: negative   Neurological: negative   Musculoskeletal: negative   Integument/breast: negative   Endocrine: negative   Allergic/Immunologic: negative     Past Medical History:  No past medical history on file.    Past Surgical History:  Past Surgical History:   Procedure Laterality Date   • APPENDECTOMY N/A 2018    Procedure: APPENDECTOMY LAPAROSCOPIC;  Surgeon: Alex Kuo MD;  Location: [facility];  Service: General        Family History:  Family History   Problem Relation Age of Onset   • Diabetes Mother    • Thyroid disease Mother    • Hypertension Mother    • Heart disease Father    • Heart attack Father    • Diabetes Maternal Grandfather         Social History:   Social History   Substance Use Topics   • Smoking status: Never Smoker   • Smokeless tobacco: Never Used   • Alcohol use Yes      Comment: rare        Allergies:  No Known Allergies    Home Meds:    (Not in a hospital admission)    Objective:    Vitals Ranges:   Heart Rate:  [62] 62  BP: (144)/(74) 144/74  Body mass index is 41.03 kg/m².     Exam:  General Appearance:  WDWN    HEENT:   without obvious abnormality,  Conjunctiva/corneas clear,   Normal external ear canals, no drainage    Clear orsalmucosa,  Mallampati score 3    Neck:  Supple, symmetrical, trachea midline. No JVD.  Lungs:   Bilateral basal rhonchi bilaterally, respirations unlabored symmetrical wall movement.    Chest wall:  No tenderness or deformity.    Heart:  Regular rate and rhythm, S1 and S2 normal.  Extremities: Trace edema no clubbing or Cyanosis        Data Review:  All labs (24hrs): No results found for this or any previous visit (from the past 24 hour(s)).     Imaging:  [unfilled]    ASSESSMENT:  Diagnoses and all orders for this visit:    Obstructive sleep apnea, adult    Non-seasonal allergic rhinitis, unspecified trigger    Chronic fatigue  -     Testosterone (Free & Total), LC / MS; Future        PLAN:   This is patient with symptoms of obstructive sleep apnea, NPSG study ASAP / split night study, Avoid supine avoid sedative meds in pm, weight loss, Avoid driving. Long discussion with patient about the physiology of CAROLYN, and long term and short term   benefit of treating CAROLYN      It is recommended to keep allergic rhinitis treatment  optimized to improve quality of sleep    Check testerone level  Katrina Mendes MD. D, ABSM.  11/6/2018  11:06 AM

## 2018-11-09 LAB
TESTOST FREE SERPL-MCNC: 5.9 PG/ML (ref 7.2–24)
TESTOST SERPL-MCNC: 282.7 NG/DL (ref 264–916)

## 2019-10-29 ENCOUNTER — OFFICE VISIT (OUTPATIENT)
Dept: SLEEP MEDICINE | Facility: HOSPITAL | Age: 54
End: 2019-10-29
Attending: INTERNAL MEDICINE

## 2019-10-29 VITALS
HEART RATE: 55 BPM | SYSTOLIC BLOOD PRESSURE: 141 MMHG | DIASTOLIC BLOOD PRESSURE: 85 MMHG | HEIGHT: 73 IN | BODY MASS INDEX: 38.43 KG/M2 | WEIGHT: 290 LBS

## 2019-10-29 DIAGNOSIS — R53.82 CHRONIC FATIGUE: ICD-10-CM

## 2019-10-29 DIAGNOSIS — G47.33 OBSTRUCTIVE SLEEP APNEA, ADULT: Primary | ICD-10-CM

## 2019-10-29 PROCEDURE — G0463 HOSPITAL OUTPT CLINIC VISIT: HCPCS

## 2019-10-29 NOTE — PROGRESS NOTES
PULMONARY SLEEP CONSULT NOTE      PATIENT IDENTIFICATION:  Name: Frna Houser  Age: 54 y.o.  Sex: male  :  1965  MRN: [id number]    DATE OF CONSULTATION:  10/29/2019   Referring Physician: No admitting provider for patient encounter.                  CHIEF COMPLAINT: Obstructive Sleep Apnea    History of Present Illness:   Fran Houser is a 54 y.o. male Pt on CPAP feeling better more energy especially the night he use it more than 4 hours, no sleepiness no fatigue no tiredness, no mask irritation no dryness, compliance report reviewed with pt AHI< 5 with good usage.       Review of Systems:   Constitutional: negative   Eyes: negative   ENT/oropharynx: negative   Cardiovascular: negative   Respiratory: negative   Gastrointestinal: negative   Genitourinary: negative   Neurological: negative   Musculoskeletal: negative   Integument/breast: negative   Endocrine: negative   Allergic/Immunologic: negative     Past Medical History:  No past medical history on file.    Past Surgical History:  Past Surgical History:   Procedure Laterality Date   • APPENDECTOMY N/A 2018    Procedure: APPENDECTOMY LAPAROSCOPIC;  Surgeon: Alex Kuo MD;  Location: [facility];  Service: General        Family History:  Family History   Problem Relation Age of Onset   • Diabetes Mother    • Thyroid disease Mother    • Hypertension Mother    • Heart disease Father    • Heart attack Father    • Diabetes Maternal Grandfather         Social History:   Social History     Tobacco Use   • Smoking status: Never Smoker   • Smokeless tobacco: Never Used   Substance Use Topics   • Alcohol use: Yes     Comment: rare        Allergies:  No Known Allergies    Home Meds:    (Not in a hospital admission)    Objective:    Vitals Ranges:   Heart Rate:  [55] 55  BP: (141)/(85) 141/85  Body mass index is 38.26 kg/m².     Exam:  General Appearance:  WDWN    HEENT:   without obvious abnormality,  Conjunctiva/corneas clear,  Normal external ear canals,  no drainage    Clear orsalmucosa,  Mallampati score 3    Neck:  Supple, symmetrical, trachea midline. No JVD.  Lungs:   Bilateral basal rhonchi bilaterally, respirations unlabored symmetrical wall movement.    Chest wall:  No tenderness or deformity.    Heart:  Regular rate and rhythm, S1 and S2 normal.  Extremities: Trace edema no clubbing or Cyanosis        Data Review:  All labs (24hrs): No results found for this or any previous visit (from the past 24 hour(s)).     Imaging:  [unfilled]    ASSESSMENT:  Diagnoses and all orders for this visit:    Obstructive sleep apnea, adult    Chronic fatigue        PLAN:   This patient with obstructive sleep apnea, compliance is improved. Encourage to use it more frequent, I re-emphasized on pt the long and short term benefit of treating CAROLYN.     Katrina Mendes MD. D, ABSM.  10/29/2019  11:20 AM

## 2020-11-03 ENCOUNTER — APPOINTMENT (OUTPATIENT)
Dept: SLEEP MEDICINE | Facility: HOSPITAL | Age: 55
End: 2020-11-03

## 2020-12-01 ENCOUNTER — APPOINTMENT (OUTPATIENT)
Dept: SLEEP MEDICINE | Facility: HOSPITAL | Age: 55
End: 2020-12-01

## 2021-05-17 ENCOUNTER — TELEPHONE (OUTPATIENT)
Dept: GASTROENTEROLOGY | Facility: CLINIC | Age: 56
End: 2021-05-17

## 2021-06-04 ENCOUNTER — PREP FOR SURGERY (OUTPATIENT)
Dept: OTHER | Facility: HOSPITAL | Age: 56
End: 2021-06-04

## 2021-06-04 DIAGNOSIS — Z80.0 FAMILY HISTORY OF COLON CANCER: ICD-10-CM

## 2021-06-04 DIAGNOSIS — Z12.11 ENCOUNTER FOR SCREENING FOR MALIGNANT NEOPLASM OF COLON: Primary | ICD-10-CM

## 2021-06-09 PROBLEM — Z12.11 ENCOUNTER FOR SCREENING FOR MALIGNANT NEOPLASM OF COLON: Status: ACTIVE | Noted: 2021-06-09

## 2021-06-09 PROBLEM — Z80.0 FAMILY HISTORY OF COLON CANCER: Status: ACTIVE | Noted: 2021-06-09

## 2021-06-09 NOTE — TELEPHONE ENCOUNTER
Called and spoke with patient.  Scheduled at [facility] on 10/21/2021 at 8am - arrive 7am.  Will mail instructions.

## 2021-09-30 ENCOUNTER — TRANSCRIBE ORDERS (OUTPATIENT)
Dept: ADMINISTRATIVE | Facility: HOSPITAL | Age: 56
End: 2021-09-30

## 2021-09-30 DIAGNOSIS — U07.1 COVID-19: Primary | ICD-10-CM

## 2021-10-05 ENCOUNTER — OFFICE VISIT (OUTPATIENT)
Dept: SLEEP MEDICINE | Facility: HOSPITAL | Age: 56
End: 2021-10-05

## 2021-10-05 VITALS
SYSTOLIC BLOOD PRESSURE: 160 MMHG | DIASTOLIC BLOOD PRESSURE: 95 MMHG | BODY MASS INDEX: 40.02 KG/M2 | WEIGHT: 302 LBS | HEIGHT: 73 IN | HEART RATE: 62 BPM

## 2021-10-05 DIAGNOSIS — G47.33 OBSTRUCTIVE SLEEP APNEA, ADULT: Primary | ICD-10-CM

## 2021-10-05 DIAGNOSIS — J30.89 NON-SEASONAL ALLERGIC RHINITIS, UNSPECIFIED TRIGGER: ICD-10-CM

## 2021-10-05 PROCEDURE — G0463 HOSPITAL OUTPT CLINIC VISIT: HCPCS

## 2021-10-05 NOTE — PROGRESS NOTES
PULMONARY  CONSULT NOTE      PATIENT IDENTIFICATION:  Name: Fran Houser  Age: 56 y.o.  Sex: male  :  1965  MRN: [id number]    DATE OF CONSULTATION:  10/5/2021                     CHIEF COMPLAINT: CAROLYN    History of Present Illness:   Fran Houser is a 56 y.o. male Pt on CPAP feeling better more energy especially the night he use it more than 4 hours, no sleepiness no fatigue no tiredness, no mask irritation no dryness, compliance report reviewed with pt AHI< 5 with good usage.       Review of Systems:   Constitutional: negative   Eyes: negative   ENT/oropharynx: negative   Cardiovascular: negative   Respiratory: negative   Gastrointestinal: negative   Genitourinary: negative   Neurological: negative   Musculoskeletal: negative   Integument/breast: negative   Endocrine: negative   Allergic/Immunologic: negative     Past Medical History:  No past medical history on file.    Past Surgical History:  Past Surgical History:   Procedure Laterality Date   • APPENDECTOMY N/A 2018    Procedure: APPENDECTOMY LAPAROSCOPIC;  Surgeon: Alex Kuo MD;  Location: [facility];  Service: General        Family History:  Family History   Problem Relation Age of Onset   • Diabetes Mother    • Thyroid disease Mother    • Hypertension Mother    • Heart disease Father    • Heart attack Father    • Diabetes Maternal Grandfather         Social History:   Social History     Tobacco Use   • Smoking status: Never Smoker   • Smokeless tobacco: Never Used   Substance Use Topics   • Alcohol use: Yes     Comment: rare        Allergies:  No Known Allergies    Home Meds:  (Not in a hospital admission)      Objective:    Vitals Ranges:   Heart Rate:  [62] 62  BP: (160)/(95) 160/95  Body mass index is 39.84 kg/m².     Exam:  General Appearance:  WDWN    HEENT:   without obvious abnormality,  Conjunctiva/corneas clear,  Normal external ear canals, no drainage    Clear orsalmucosa,  Mallampati score 3    Neck:  Supple, symmetrical,  trachea midline. No JVD.  Lungs:   Bilateral basal rhonchi bilaterally, respirations unlabored symmetrical wall movement.    Chest wall:  No tenderness or deformity.    Heart:  Regular rate and rhythm, S1 and S2 normal.  Extremities: Trace edema no clubbing or Cyanosis        Data Review:  All labs (24hrs): No results found for this or any previous visit (from the past 24 hour(s)).     Imaging:  Home Sleep Study  Home Sleep Test Report  [facility]  Telephone: [phone] / [place]  Telephone: [phone] / [place]    Date of Study: 7/16/18  Interpreting Physician: MD Isaiah Monteiro III, MD    Clinical Information:  Fran Houser is a 52 y.o.male was seen in the Sleep Disorders Center today.   He reports increased snoring and apneas per his wife within past 1 year.   He had HST 6 years ago - no evidence CAROLYN. He sleeps from 12MN to 8AM. He   feels somewhat groggy when he awakens. He is somewhat refreshed. He feels   somewhat sleepy during day, denies drowsiness with driving. He has gained   50lbs over past 5 years. He has frequent awakenings at night. He is not on   Percocet per patient.    Methods:   Patient had home sleep testing with a Orthocone NightOne device. It measured   airflow at the nose and mouth. Oxygen saturation and pulse rate were   recorded by a pulse oximeter. Thoracic and/or abdominal respiratory   movements were recorded by respiratory inductive plethysmography. It   judged snoring by transducer vibration and also judged body position. The   sleep study was scored using standard techniques.     Procedure: Home sleep test was performed using a class III recording   device which monitored the following parameters; airflow, respiratory   effort, snore, heart rate and oximetry.   It measured airflow at the nose   and mouth. Oxygen saturation and pulse rate were recorded by a pulse   oximeter. Thoracic and/or abdominal respiratory movements were recorded by    respiratory inductive plethysmography. It judged snoring by transducer   vibration and also judged body position. Hypopneas were scored when there   was a peak signal excursions drop-off of > 30% of preevent baseline using   nasal pressure transducer lasting at least 10 seconds long and followed by   a > 3% oxygen desaturation from previous baseline for commercial insurance   patients and > 4% for medicare AAS VIII.1.D.1).    Diagnostic findings: The patient tolerated the home sleep testing with   monitoring time of 421 minutes. The data obtained make this a technically   adequate study. The apnea hypopneas index(AHI) was 16.1 per sleep hour.    The AHI during supine position was 27.7 per sleep hour. Mean heart rate of   56 BPM.  Snoring was noted 31% of sleep time. Lowest oxygen saturation   during the study was 83%. Saturation below 89% was noted for 3.6 mins.     Impression: This study revealed moderate obstructive sleep apnea syndrome.    During supine position almost severe obstructive sleep apnea is present.    Significant snoring is noted also.  Recommend treatment with CPAP device.     Recommendations:   Auto CPAP setting 5-20 cm.  Follow-up sleep center.       ASSESSMENT:  Diagnoses and all orders for this visit:    Obstructive sleep apnea, adult    Non-seasonal allergic rhinitis, unspecified trigger        PLAN:       This patient with obstructive sleep apnea, compliance is improved. Encourage to use it more frequent, I re-emphasized on pt the long and short term benefit of treating CAROLYN.     It is recommended to keep allergic rhinitis treatment optimized to improve quality of sleep    Follow-up 1 year    Katrina Mendes MD. D, ABSM.  10/5/2021  13:07 EDT

## 2021-10-11 DIAGNOSIS — Z12.11 ENCOUNTER FOR SCREENING FOR MALIGNANT NEOPLASM OF COLON: Primary | ICD-10-CM

## 2021-10-19 ENCOUNTER — LAB (OUTPATIENT)
Dept: LAB | Facility: HOSPITAL | Age: 56
End: 2021-10-19

## 2021-10-19 DIAGNOSIS — Z12.11 ENCOUNTER FOR SCREENING FOR MALIGNANT NEOPLASM OF COLON: ICD-10-CM

## 2021-10-19 LAB — SARS-COV-2 RNA PNL SPEC NAA+PROBE: NOT DETECTED

## 2021-10-19 PROCEDURE — C9803 HOPD COVID-19 SPEC COLLECT: HCPCS

## 2021-10-19 PROCEDURE — 87635 SARS-COV-2 COVID-19 AMP PRB: CPT | Performed by: INTERNAL MEDICINE

## 2021-10-20 ENCOUNTER — ANESTHESIA EVENT (OUTPATIENT)
Dept: PERIOP | Facility: HOSPITAL | Age: 56
End: 2021-10-20

## 2021-10-21 ENCOUNTER — HOSPITAL ENCOUNTER (OUTPATIENT)
Facility: HOSPITAL | Age: 56
Setting detail: HOSPITAL OUTPATIENT SURGERY
Discharge: HOME | End: 2021-10-21
Attending: INTERNAL MEDICINE | Admitting: INTERNAL MEDICINE

## 2021-10-21 ENCOUNTER — ANESTHESIA (OUTPATIENT)
Dept: PERIOP | Facility: HOSPITAL | Age: 56
End: 2021-10-21

## 2021-10-21 VITALS
TEMPERATURE: 97.8 F | DIASTOLIC BLOOD PRESSURE: 91 MMHG | WEIGHT: 306 LBS | HEIGHT: 73 IN | SYSTOLIC BLOOD PRESSURE: 143 MMHG | RESPIRATION RATE: 20 BRPM | BODY MASS INDEX: 40.56 KG/M2 | OXYGEN SATURATION: 97 % | HEART RATE: 48 BPM

## 2021-10-21 DIAGNOSIS — Z80.0 FAMILY HISTORY OF COLON CANCER: ICD-10-CM

## 2021-10-21 DIAGNOSIS — Z12.11 ENCOUNTER FOR SCREENING FOR MALIGNANT NEOPLASM OF COLON: ICD-10-CM

## 2021-10-21 PROCEDURE — 25010000002 PROPOFOL 10 MG/ML EMULSION: Performed by: ANESTHESIOLOGY

## 2021-10-21 PROCEDURE — 88305 TISSUE EXAM BY PATHOLOGIST: CPT | Performed by: INTERNAL MEDICINE

## 2021-10-21 PROCEDURE — 45385 COLONOSCOPY W/LESION REMOVAL: CPT | Performed by: INTERNAL MEDICINE

## 2021-10-21 PROCEDURE — 45380 COLONOSCOPY AND BIOPSY: CPT | Performed by: INTERNAL MEDICINE

## 2021-10-21 RX ORDER — MAGNESIUM HYDROXIDE 1200 MG/15ML
LIQUID ORAL AS NEEDED
Status: DISCONTINUED | OUTPATIENT
Start: 2021-10-21 | End: 2021-10-21 | Stop reason: HOSPADM

## 2021-10-21 RX ORDER — LIDOCAINE HYDROCHLORIDE 20 MG/ML
INJECTION, SOLUTION INFILTRATION; PERINEURAL AS NEEDED
Status: DISCONTINUED | OUTPATIENT
Start: 2021-10-21 | End: 2021-10-21 | Stop reason: SURG

## 2021-10-21 RX ORDER — PROPOFOL 10 MG/ML
VIAL (ML) INTRAVENOUS AS NEEDED
Status: DISCONTINUED | OUTPATIENT
Start: 2021-10-21 | End: 2021-10-21 | Stop reason: SURG

## 2021-10-21 RX ORDER — GLYCOPYRROLATE 0.2 MG/ML
INJECTION INTRAMUSCULAR; INTRAVENOUS AS NEEDED
Status: DISCONTINUED | OUTPATIENT
Start: 2021-10-21 | End: 2021-10-21 | Stop reason: SURG

## 2021-10-21 RX ORDER — SODIUM CHLORIDE 9 MG/ML
40 INJECTION, SOLUTION INTRAVENOUS AS NEEDED
Status: DISCONTINUED | OUTPATIENT
Start: 2021-10-21 | End: 2021-10-21 | Stop reason: HOSPADM

## 2021-10-21 RX ORDER — LIDOCAINE HYDROCHLORIDE 10 MG/ML
0.5 INJECTION, SOLUTION EPIDURAL; INFILTRATION; INTRACAUDAL; PERINEURAL ONCE AS NEEDED
Status: DISCONTINUED | OUTPATIENT
Start: 2021-10-21 | End: 2021-10-21 | Stop reason: HOSPADM

## 2021-10-21 RX ORDER — SODIUM CHLORIDE 0.9 % (FLUSH) 0.9 %
10 SYRINGE (ML) INJECTION AS NEEDED
Status: DISCONTINUED | OUTPATIENT
Start: 2021-10-21 | End: 2021-10-21 | Stop reason: HOSPADM

## 2021-10-21 RX ORDER — SODIUM CHLORIDE 0.9 % (FLUSH) 0.9 %
10 SYRINGE (ML) INJECTION EVERY 12 HOURS SCHEDULED
Status: DISCONTINUED | OUTPATIENT
Start: 2021-10-21 | End: 2021-10-21 | Stop reason: HOSPADM

## 2021-10-21 RX ORDER — SODIUM CHLORIDE, SODIUM LACTATE, POTASSIUM CHLORIDE, CALCIUM CHLORIDE 600; 310; 30; 20 MG/100ML; MG/100ML; MG/100ML; MG/100ML
9 INJECTION, SOLUTION INTRAVENOUS CONTINUOUS PRN
Status: DISCONTINUED | OUTPATIENT
Start: 2021-10-21 | End: 2021-10-21 | Stop reason: HOSPADM

## 2021-10-21 RX ADMIN — SODIUM CHLORIDE, POTASSIUM CHLORIDE, SODIUM LACTATE AND CALCIUM CHLORIDE 9 ML/HR: 600; 310; 30; 20 INJECTION, SOLUTION INTRAVENOUS at 07:33

## 2021-10-21 RX ADMIN — PROPOFOL 360 MG: 10 INJECTION, EMULSION INTRAVENOUS at 08:27

## 2021-10-21 RX ADMIN — GLYCOPYRROLATE 0.2 MG: 0.2 INJECTION INTRAMUSCULAR; INTRAVENOUS at 07:51

## 2021-10-21 RX ADMIN — LIDOCAINE HYDROCHLORIDE 80 MG: 20 INJECTION, SOLUTION INFILTRATION; PERINEURAL at 08:27

## 2021-10-21 NOTE — H&P
"Patient Care Team:  Provider, No Known as PCP - Alex Horvath MD as Surgeon (General Surgery)    CHIEF COMPLAINT: Screening CRC and Family hx of CRC or polyps    HISTORY OF PRESENT ILLNESS:  First Colon     Past Medical History:   Diagnosis Date   • Sleep apnea      Past Surgical History:   Procedure Laterality Date   • APPENDECTOMY N/A 4/30/2018    Procedure: APPENDECTOMY LAPAROSCOPIC;  Surgeon: Alex Kuo MD;  Location: [facility];  Service: General     Family History   Problem Relation Age of Onset   • Diabetes Mother    • Thyroid disease Mother    • Hypertension Mother    • Heart disease Father    • Heart attack Father    • Diabetes Maternal Grandfather      Social History     Tobacco Use   • Smoking status: Never Smoker   • Smokeless tobacco: Never Used   Substance Use Topics   • Alcohol use: Yes     Comment: rare   • Drug use: No     Medications Prior to Admission   Medication Sig Dispense Refill Last Dose   • aspirin 81 MG chewable tablet Chew 81 mg Daily.   10/15/2021   • Multiple Vitamins-Minerals (MULTIVITAMIN WITH MINERALS) tablet tablet Take 1 tablet by mouth Daily.   10/15/2021   • Omega-3 Fatty Acids (FISH OIL) 1000 MG capsule capsule Take  by mouth Daily With Breakfast.   10/15/2021     Allergies:  Patient has no known allergies.    REVIEW OF SYSTEMS:  Please see the above history of present illness for pertinent positives and negatives.  The remainder of the patient's systems have been reviewed and are negative.     Vital Signs  Temp:  [97.9 °F (36.6 °C)] 97.9 °F (36.6 °C)  Heart Rate:  [52] 52  Resp:  [19] 19  BP: (156)/(82) 156/82    Flowsheet Rows      First Filed Value   Admission Height 185 cm (72.84\") Documented at 10/20/2021 1702   Admission Weight 137 kg (302 lb) Documented at 10/20/2021 1702           Physical Exam:  Physical Exam   Constitutional: Patient appears well-developed and well-nourished and in no acute distress   HEENT:   Head: Normocephalic and atraumatic.   Eyes: "  Pupils are equal, round, and reactive to light. EOM are intact. Sclerae are anicteric and non-injected.  Mouth and Throat: Patient has moist mucous membranes. Oropharynx is clear of any erythema or exudate.     Neck: Neck supple. No JVD present. No thyromegaly present. No lymphadenopathy present.  Cardiovascular: Regular rate, regular rhythm, S1 normal and S2 normal.  Exam reveals no gallop and no friction rub.  No murmur heard.  Pulmonary/Chest: Lungs are clear to auscultation bilaterally. No respiratory distress. No wheezes. No rhonchi. No rales.   Abdominal: Soft. Bowel sounds are normal. No distension and no mass. There is no hepatosplenomegaly. There is no tenderness.   Musculoskeletal: Normal Muscle tone  Extremities: No edema. Pulses are palpable in all 4 extremities.  Neurological: Patient is alert and oriented to person, place, and time. Cranial nerves II-XII are grossly intact with no focal deficits.  Skin: Skin is warm. No rash noted. Nails show no clubbing.  No cyanosis or erythema.    Debilities/Disabilities Identified: None  Emotional Behavior: Appropriate     Results Review:   I reviewed the patient's new clinical results.    Lab Results (most recent)     None          Imaging Results (Most Recent)     None        reviewed    ECG/EMG Results (most recent)     None        reviewed    Assessment/Plan   Screening CRC and Family hx of CRC or polyps/  colonoscopy      I discussed the patient's findings and my recommendations with patient.     Elier Poon MD  10/21/21  08:21 EDT    Time: 10 min prior to procedure.

## 2021-10-21 NOTE — OP NOTE
COLONOSCOPY WITH POLYPECTOMY  Procedure Report    Patient Name:  Fran Houser  YOB: 1965    Date of Surgery:  10/21/2021     Indications:  Family history of colon cancer [Z80.0]  Encounter for screening for malignant neoplasm of colon [Z12.11]      Pre-op Diagnosis:   Family history of colon cancer [Z80.0]  Encounter for screening for malignant neoplasm of colon [Z12.11]    Post-Op Diagnosis Codes:     * Family history of colon cancer [Z80.0]     * Encounter for screening for malignant neoplasm of colon [Z12.11]     * Diverticulosis [K57.90]     * Colon polyp [K63.5]         Procedure/CPT® Codes:      Procedure(s):  COLONOSCOPY WITH POLYPECTOMY    Staff:  Surgeon(s):  Elier Poon MD         Anesthesia: Monitored Anesthesia Care    Estimated Blood Loss: none    Specimens:   ID Type Source Tests Collected by Time   A (Not marked as sent) : ascending colon polyps x4 Polyp Large Intestine, Right / Ascending Colon TISSUE PATHOLOGY EXAM Elier Poon MD 10/21/2021 0837   B (Not marked as sent) : transverse colon polyps x2 Polyp Large Intestine, Transverse Colon TISSUE PATHOLOGY EXAM Elier Poon MD 10/21/2021 0845       Implants:    Nothing was implanted during the procedure      Description of Procedure: After having signed informed consent, he was brought to the endoscopy suite and placed in the left lateral decubitus position. He was given intravenous sedation. Rectal exam revealed no external lesions, normal anal tone, and normal palpable prostate. The scope was introduced into the rectum and advanced under direct visualization with ease through the rectum, sigmoid colon, and descending colon. There were scattered small diverticula noted in the sigmoid colon. Preparation of the colon was not good. There was not only significant opaque liquid stool, there was particulate matter that prohibited clearing all of the remaining bowel contents. The scope was  advanced to and around the splenic flexure, reduced into the transverse colon, advanced with some looping to and around the hepatic flexure, reduced in the ascending colon, and then advanced fairly easily into the cecum. The cecum was poorly prepped, but a significant amount of time was spent flushing and aspirating so that the lack of an appendiceal orifice was identified. The ileocecal valve was identified. This was intubated. The terminal ileum was normal appearing. The scope was withdrawn back into the ascending colon. The cecum had no mucosal abnormalities. On the fold of the ileocecal valve was a 3-4 mm polyp. Next to that, in the ascending colon was a similar sized 4-5 mm polyp, both were removed with cold biopsy forceps. The scope was then withdrew through the ascending colon. In the distal ascending colon was a third diminutive polyp, as well as a fourth sessile polyp that was closer to 1 cm in size. It was removed with a cold snare technique. All four were sent together as ascending colon polyps x4. The scope was withdrawn around the hepatic flexure, which was normal appearing, and back into the transverse colon. From the transverse colon, there were two sessile polyps that ranged in size from 4-7 mm. The 7 mm was removed with a cold snare and the 4 mm with biopsy forceps. They were sent together as transverse colon polyps x2. As the scope was withdrawn, the remainder of the transverse colon was normal. The descending colon had a significant amount of retained bowel contents, not all of which could be aspirated. This continued into the sigmoid colon, distinguishing his prep as being poor. Within the rectum, the scope was retroflexed revealing an intact dentate line and no mucosal lesions. The scope was deretroflexed and withdrawn. The patient tolerated the procedure well.           Findings: Colon to TI Poor Prep  Polyp-6- 2 x Cold Snare, Biopsy  Diverticulosis     Complications: None    Recommendations:  Results to be called. and Repeat in 3 years, with 1.5 day Prep      Elier Poon MD     Date: 10/21/2021  Time: 08:53 EDT

## 2021-10-21 NOTE — NURSING NOTE
Patient having difficulty passing out air post procedure but walked to bathroom and spent approximately 15 minutes in the bathroom with successful results of passing air.

## 2021-10-21 NOTE — BRIEF OP NOTE
COLONOSCOPY WITH POLYPECTOMY  Progress Note    Fran Houser  10/21/2021    Pre-op Diagnosis:   Family history of colon cancer [Z80.0]  Encounter for screening for malignant neoplasm of colon [Z12.11]       Post-Op Diagnosis Codes:     * Family history of colon cancer [Z80.0]     * Encounter for screening for malignant neoplasm of colon [Z12.11]     * Diverticulosis [K57.90]     * Colon polyp [K63.5]    Procedure/CPT® Codes:        Procedure(s):  COLONOSCOPY WITH POLYPECTOMY    Surgeon(s):  Elier Poon MD    Anesthesia: Monitored Anesthesia Care    Staff:   Circulator: Betsy Fuchs RN; Mariah Ramos RN  Scrub Person: Teresa Jones         Estimated Blood Loss: none    Urine Voided: * No values recorded between 10/21/2021  8:17 AM and 10/21/2021  8:50 AM *    Specimens:                Specimens     ID Source Type Tests Collected By Collected At Frozen?    A Large Intestine, Right / Ascending Colon Polyp · TISSUE PATHOLOGY EXAM   Elier Poon MD 10/21/21 0837     Description: ascending colon polyps x4    This specimen was not marked as sent.    B Large Intestine, Transverse Colon Polyp · TISSUE PATHOLOGY EXAM   Elier Poon MD 10/21/21 0845     Description: transverse colon polyps x2    This specimen was not marked as sent.                Drains:   [REMOVED] Closed/Suction Drain 1 Anterior Abdomen Bulb 10 Fr. (Removed)       Findings: Colon to TI Poor Prep  Polyp-6- 2 x Cold Snare, Biopsy  Diverticulosis    Complications: None          Elier Poon MD     Date: 10/21/2021  Time: 08:52 EDT

## 2021-10-22 LAB
LAB AP CASE REPORT: NORMAL
LAB AP DIAGNOSIS COMMENT: NORMAL
PATH REPORT.FINAL DX SPEC: NORMAL
PATH REPORT.GROSS SPEC: NORMAL

## 2021-11-12 NOTE — ANESTHESIA PREPROCEDURE EVALUATION
Anesthesia Evaluation     Patient summary reviewed and Nursing notes reviewed   no history of anesthetic complications:  NPO Solid Status: > 8 hours  NPO Liquid Status: > 8 hours           Airway   Mallampati: II  TM distance: >3 FB  Neck ROM: full  No difficulty expected and Large neck circumference  Comment: Last intubation 2018 with Stover 2 blade - Grade II view.  Dental - normal exam     Pulmonary - normal exam    breath sounds clear to auscultation  (+) sleep apnea ( not diagnosed but significant snoring) on CPAP,   Cardiovascular - negative cardio ROS and normal exam  Exercise tolerance: good (4-7 METS)    ECG reviewed  Rhythm: regular  Rate: normal      ROS comment: EKG from 2018 on record    Narrative & Impression    RR Interval= 1132 ms  ND Interval= 176 ms  QRSD Interval= 94 ms  QT Interval= 424 ms  QTc Interval= 399 ms  Heart Rate= 53 ms  P Axis= 15 deg  QRS Axis= 26 deg  T Wave Axis= 10 deg  I: 40 Axis= 25 deg  T: 40 Axis= 27 deg  ST Axis= 34 deg  SINUS RHYTHM  NO PRIOR TRACING AVAILABLE FOR COMPARISON  Electronically Signed by:  Roberth Mccallum ([facility]) 30-Apr-2018 20:38:24  Date and Time of Study: 2018-04-30 14:48:37        Neuro/Psych- negative ROS  GI/Hepatic/Renal/Endo    (+) obesity, morbid obesity,      Musculoskeletal (-) negative ROS    Abdominal   (+) obese,    Substance History - negative use     OB/GYN negative ob/gyn ROS         Other - negative ROS       ROS/Med Hx Other: Sinus Bradycardia in Pre-op w/ HR 49-52 BPM                    Anesthesia Plan    ASA 3     MAC     intravenous induction     Anesthetic plan, all risks, benefits, and alternatives have been provided, discussed and informed consent has been obtained with: patient.  Use of blood products discussed with patient  Consented to blood products.     
1 week

## 2022-04-06 ENCOUNTER — OFFICE VISIT (OUTPATIENT)
Dept: FAMILY MEDICINE CLINIC | Facility: CLINIC | Age: 57
End: 2022-04-06

## 2022-04-06 VITALS
HEART RATE: 87 BPM | OXYGEN SATURATION: 98 % | BODY MASS INDEX: 40.02 KG/M2 | SYSTOLIC BLOOD PRESSURE: 144 MMHG | TEMPERATURE: 96 F | HEIGHT: 73 IN | DIASTOLIC BLOOD PRESSURE: 76 MMHG | WEIGHT: 302 LBS

## 2022-04-06 DIAGNOSIS — R73.01 IMPAIRED FASTING GLUCOSE: Primary | ICD-10-CM

## 2022-04-06 DIAGNOSIS — E78.00 PURE HYPERCHOLESTEROLEMIA: ICD-10-CM

## 2022-04-06 DIAGNOSIS — Z00.00 HEALTH CARE MAINTENANCE: ICD-10-CM

## 2022-04-06 DIAGNOSIS — Z12.5 SCREENING PSA (PROSTATE SPECIFIC ANTIGEN): ICD-10-CM

## 2022-04-06 PROBLEM — G47.33 OBSTRUCTIVE SLEEP APNEA, ADULT: Chronic | Status: ACTIVE | Noted: 2018-11-06

## 2022-04-06 PROBLEM — M79.669 PAIN AND SWELLING OF LOWER LEG: Status: RESOLVED | Noted: 2017-02-20 | Resolved: 2022-04-06

## 2022-04-06 PROBLEM — Z12.11 ENCOUNTER FOR SCREENING FOR MALIGNANT NEOPLASM OF COLON: Status: RESOLVED | Noted: 2021-06-09 | Resolved: 2022-04-06

## 2022-04-06 PROBLEM — K35.30 ACUTE APPENDICITIS WITH LOCALIZED PERITONITIS: Status: RESOLVED | Noted: 2018-04-30 | Resolved: 2022-04-06

## 2022-04-06 PROBLEM — S99.912A LEFT ANKLE INJURY: Status: RESOLVED | Noted: 2017-02-20 | Resolved: 2022-04-06

## 2022-04-06 PROBLEM — M79.89 PAIN AND SWELLING OF LOWER LEG: Status: RESOLVED | Noted: 2017-02-20 | Resolved: 2022-04-06

## 2022-04-06 PROCEDURE — 99203 OFFICE O/P NEW LOW 30 MIN: CPT | Performed by: FAMILY MEDICINE

## 2022-04-06 NOTE — PROGRESS NOTES
"Chief Complaint  Establish Care    Subjective          Fran Houser presents to [facility]  History of Present Illness     Here to get established.  New patient.  Impaired fasting glucose.  A1c 5.8% a year and a half ago.  Glucose levels running between 114 and 103.  Mother with diabetes.  Mother with metastatic colon cancer and hospice care.  Patient is coping.    Elevated blood pressure readings.  His systolic blood pressures been in the 140s the last 3 visits in our system.  He is overweight.  He gets some walking in but not much exercise otherwise.  No diabetic neuropathy symptoms.    Hyperlipidemia.  His LDL runs about 150.  HDL low in the 40s.  Triglycerides normal.  Last lipid panel was about a year and half ago when our records from the outside system.        Objective   Vital Signs:   /76   Pulse 87   Temp 96 °F (35.6 °C) (Temporal)   Ht 185 cm (72.84\")   Wt (!) 137 kg (302 lb)   SpO2 98%   BMI 40.02 kg/m²     BMI is above normal parameters. Recommendations: exercise counseling/recommendations and nutrition counseling/recommendations       Physical Exam  Vitals and nursing note reviewed.   Constitutional:       General: He is not in acute distress.     Appearance: He is well-developed. He is obese.   Cardiovascular:      Rate and Rhythm: Normal rate and regular rhythm.      Heart sounds: Normal heart sounds.   Pulmonary:      Effort: Pulmonary effort is normal.      Breath sounds: Normal breath sounds.   Musculoskeletal:      Cervical back: Normal range of motion.   Skin:     General: Skin is warm and dry.   Psychiatric:         Mood and Affect: Mood normal.        Result Review :                   Assessment and Plan    Diagnoses and all orders for this visit:    1. Impaired fasting glucose (Primary)  -     Hemoglobin A1c; Future    2. Health care maintenance  -     Comprehensive Metabolic Panel; Future  -     CBC & Differential; Future  -     Lipid Panel; " Future  -     PSA Screen; Future    3. Screening PSA (prostate specific antigen)  -     PSA Screen; Future    4. Pure hypercholesterolemia  -     Comprehensive Metabolic Panel; Future  -     Lipid Panel; Future      Impaired fasting glucose.  Checking A1c.  Checking fasting blood work later this week.  I will see him back in 6 weeks for annual complete physical examination.    Elevated blood pressure readings.  Suspicious for essential hypertension.  He is going to be checking his blood pressure at home on a regular basis and sending us readings.  I will see him back in 6 weeks.  May very well need medication.    Hyperlipidemia.  We will recheck his lipid panel soon and determine 10-year risk of atherosclerotic coronary vascular disease.  We will discuss more next visit.    Mother with metastatic colon cancer in hospice care.  Patient is coping.      Follow Up   No follow-ups on file.  Patient was given instructions and counseling regarding his condition or for health maintenance advice. Please see specific information pulled into the AVS if appropriate.

## 2022-08-17 ENCOUNTER — LAB (OUTPATIENT)
Dept: LAB | Facility: HOSPITAL | Age: 57
End: 2022-08-17

## 2022-08-17 PROCEDURE — 80053 COMPREHEN METABOLIC PANEL: CPT | Performed by: FAMILY MEDICINE

## 2022-08-17 PROCEDURE — 83036 HEMOGLOBIN GLYCOSYLATED A1C: CPT | Performed by: FAMILY MEDICINE

## 2022-08-17 PROCEDURE — 80061 LIPID PANEL: CPT | Performed by: FAMILY MEDICINE

## 2022-08-17 PROCEDURE — G0103 PSA SCREENING: HCPCS | Performed by: FAMILY MEDICINE

## 2022-08-17 PROCEDURE — 85025 COMPLETE CBC W/AUTO DIFF WBC: CPT | Performed by: FAMILY MEDICINE

## 2022-09-20 ENCOUNTER — OFFICE VISIT (OUTPATIENT)
Dept: FAMILY MEDICINE CLINIC | Facility: CLINIC | Age: 57
End: 2022-09-20

## 2022-09-20 VITALS
HEIGHT: 73 IN | HEART RATE: 61 BPM | BODY MASS INDEX: 40.13 KG/M2 | SYSTOLIC BLOOD PRESSURE: 135 MMHG | TEMPERATURE: 96.9 F | OXYGEN SATURATION: 99 % | WEIGHT: 302.8 LBS | DIASTOLIC BLOOD PRESSURE: 76 MMHG

## 2022-09-20 DIAGNOSIS — E78.00 PURE HYPERCHOLESTEROLEMIA: ICD-10-CM

## 2022-09-20 DIAGNOSIS — Z00.00 HEALTH CARE MAINTENANCE: Primary | ICD-10-CM

## 2022-09-20 DIAGNOSIS — R73.01 IMPAIRED FASTING GLUCOSE: ICD-10-CM

## 2022-09-20 DIAGNOSIS — L98.9 SKIN LESION OF FACE: ICD-10-CM

## 2022-09-20 PROCEDURE — 99396 PREV VISIT EST AGE 40-64: CPT | Performed by: FAMILY MEDICINE

## 2022-09-20 PROCEDURE — 99213 OFFICE O/P EST LOW 20 MIN: CPT | Performed by: FAMILY MEDICINE

## 2022-09-20 RX ORDER — LOSARTAN POTASSIUM AND HYDROCHLOROTHIAZIDE 12.5; 5 MG/1; MG/1
1 TABLET ORAL DAILY
Qty: 90 TABLET | Refills: 1 | Status: SHIPPED | OUTPATIENT
Start: 2022-09-20

## 2022-09-20 NOTE — PROGRESS NOTES
"Chief Complaint  Annual Exam (Wants to talk about weight loss )    Subjective        Fran Houser presents to [facility]  History of Present Illness     Here for annual healthcare maintenance visit.  Obesity.  Fatty liver.  Dyslipidemia.  He has had some psychosocial stressors.  His mother passed away complications with colon cancer at an advanced age.  His dogs been ill.  He is interested in weight loss medication.  He is concerned about his testosterone, but it was normal couple years ago at [facility].  No specific hypogonadal symptoms.  Otherwise doing well.  His blood pressure was elevated last visit.  He is been checking his blood pressure at home.  Its elevated.  Typical systolic in the 130s or 140s.    The 10-year ASCVD risk score (Tiffdesi KING Jr., et al., 2013) is: 9%    Values used to calculate the score:      Age: 57 years      Sex: Male      Is Non- : No      Diabetic: No      Tobacco smoker: No      Systolic Blood Pressure: 135 mmHg      Is BP treated: No      HDL Cholesterol: 37 mg/dL      Total Cholesterol: 209 mg/dL      Objective   Vital Signs:  /76   Pulse 61   Temp 96.9 °F (36.1 °C) (Temporal)   Ht 185.4 cm (73\")   Wt (!) 137 kg (302 lb 12.8 oz)   SpO2 99%   BMI 39.95 kg/m²   Estimated body mass index is 39.95 kg/m² as calculated from the following:    Height as of this encounter: 185.4 cm (73\").    Weight as of this encounter: 137 kg (302 lb 12.8 oz).           Physical Exam  Constitutional:       Appearance: Normal appearance. He is obese.   HENT:      Head: Atraumatic.      Mouth/Throat:      Pharynx: Oropharynx is clear. No oropharyngeal exudate or posterior oropharyngeal erythema.   Eyes:      Conjunctiva/sclera: Conjunctivae normal.   Neck:      Thyroid: No thyroid mass, thyromegaly or thyroid tenderness.   Cardiovascular:      Rate and Rhythm: Normal rate and regular rhythm.      Pulses: Normal pulses.      Heart sounds: Normal " heart sounds.   Pulmonary:      Effort: Pulmonary effort is normal.      Breath sounds: Normal breath sounds.   Abdominal:      General: Abdomen is flat. There is no distension.      Palpations: Abdomen is soft. There is no mass.      Tenderness: There is no abdominal tenderness.      Hernia: No hernia is present.   Genitourinary:     Comments: No inguinal hernia palpable  Musculoskeletal:         General: Normal range of motion.      Cervical back: Normal range of motion and neck supple. No muscular tenderness.   Lymphadenopathy:      Cervical: No cervical adenopathy.   Skin:     General: Skin is warm and dry.      Findings: No rash.      Comments: There is a pigmented lesion on his left upper abdomen.  He states is been there a long time.  It is papular.  Irregular borders.  Irregular colors.  Greater than 6 mm.  He also has a fleshy papule on the left side of his face.   Neurological:      General: No focal deficit present.      Mental Status: He is alert and oriented to person, place, and time.   Psychiatric:         Mood and Affect: Mood normal.        Result Review :  The following data was reviewed by: Jason Allison MD on 09/20/2022:  Common labs    Common Labs 8/17/22 8/17/22 8/17/22 8/17/22 8/17/22    1016 1016 1016 1016 1016   Glucose  94      BUN  10      Creatinine  0.84      Sodium  138      Potassium  4.2      Chloride  104      Calcium  8.9      Albumin  4.10      Total Bilirubin  0.3      Alkaline Phosphatase  55      AST (SGOT)  44 (A)      ALT (SGPT)  72 (A)      WBC 5.53       Hemoglobin 13.8       Hematocrit 40.0       Platelets 241       Total Cholesterol   209 (A)     Triglycerides   131     HDL Cholesterol   37 (A)     LDL Cholesterol    148 (A)     Hemoglobin A1C    5.80 (A)    PSA     0.566   (A) Abnormal value                      Assessment and Plan   Diagnoses and all orders for this visit:    1. Health care maintenance (Primary)    2. Skin lesion of face  -     Ambulatory Referral to  Dermatology    3. Impaired fasting glucose  -     Hemoglobin A1c; Future    4. Pure hypercholesterolemia  -     Hemoglobin A1c; Future  -     Comprehensive Metabolic Panel; Future  -     Lipid Panel; Future    Other orders  -     losartan-hydrochlorothiazide (Hyzaar) 50-12.5 MG per tablet; Take 1 tablet by mouth Daily.  Dispense: 90 tablet; Refill: 1      Annual healthcare maintenance visit.    Immunizations.  COVID-19 booster recommended.    Skin lesion on face.  Skin lesion on abdomen.  1 on the abdomen is suspicious looking.  Patient understands importance of following up with a dermatologist.  Names given.  Referral placed.    Hypertension.  New diagnosis.  Needs treatment.  Start losartan hydrochlorothiazide 1/2 tablet a day.  He will send me numbers in 3 weeks.  If not improved go to a full tablet.  Creatinine normal.    Colon cancer screening up-to-date.    Prostate or screening.  Normal PSA.  No  symptoms.    Other preventative health practices discussed.    Obesity.  Discussed Saxenda another GLP-1 agonist for weight loss.  Patient wants to take under advisement.  I will see him back in 3 months with lab work prior.             Follow Up   No follow-ups on file.  Patient was given instructions and counseling regarding his condition or for health maintenance advice. Please see specific information pulled into the AVS if appropriate.

## 2022-10-11 ENCOUNTER — OFFICE VISIT (OUTPATIENT)
Dept: SLEEP MEDICINE | Facility: HOSPITAL | Age: 57
End: 2022-10-11

## 2022-10-11 VITALS
HEIGHT: 71 IN | DIASTOLIC BLOOD PRESSURE: 87 MMHG | WEIGHT: 304 LBS | BODY MASS INDEX: 42.56 KG/M2 | SYSTOLIC BLOOD PRESSURE: 147 MMHG | HEART RATE: 64 BPM

## 2022-10-11 DIAGNOSIS — G47.33 OBSTRUCTIVE SLEEP APNEA, ADULT: Primary | Chronic | ICD-10-CM

## 2022-10-11 DIAGNOSIS — I10 ESSENTIAL HYPERTENSION: ICD-10-CM

## 2022-10-11 DIAGNOSIS — J30.89 NON-SEASONAL ALLERGIC RHINITIS, UNSPECIFIED TRIGGER: ICD-10-CM

## 2022-10-11 PROCEDURE — G0463 HOSPITAL OUTPT CLINIC VISIT: HCPCS

## 2022-10-11 NOTE — PROGRESS NOTES
SLEEP/PULMONARY  CLINIC NOTE      PATIENT IDENTIFICATION:  Name: Fran Houser  Age: 57 y.o.  Sex: male  :  1965  MRN: [id number]    DATE OF CONSULTATION:  10/11/2022                     CHIEF COMPLAINT: CAROLYN    History of Present Illness:   Fran Houser is a 57 y.o. male Pt on CPAP feeling better more energy especially the night he use it more than 4 hours, no sleepiness no fatigue no tiredness, no mask irritation no dryness, compliance report reviewed with pt d discussed with the patient the download data and encouarged the patient to continue to use the CPAP. The residual AHI is acceptable.  Patient is regulars hypertension started on medication    Review of Systems:   Constitutional: negative   Eyes: negative   ENT/oropharynx: negative   Cardiovascular: negative   Respiratory: negative   Gastrointestinal: negative   Genitourinary: negative   Neurological: negative   Musculoskeletal: negative   Integument/breast: negative   Endocrine: negative   Allergic/Immunologic: negative     Past Medical History:  Past Medical History:   Diagnosis Date   • Acute appendicitis with localized peritonitis 2018   • Sleep apnea        Past Surgical History:  Past Surgical History:   Procedure Laterality Date   • APPENDECTOMY N/A 2018    Procedure: APPENDECTOMY LAPAROSCOPIC;  Surgeon: Alex Kuo MD;  Location: [facility];  Service: General   • COLONOSCOPY  Oct. 2021 at [facility]     colonoscopy /  benign polyps removed   • COLONOSCOPY W/ POLYPECTOMY N/A 10/21/2021    Procedure: COLONOSCOPY WITH POLYPECTOMY;  Surgeon: Elier Poon MD;  Location: [facility];  Service: Gastroenterology;  Laterality: N/A;  poor prep  diverticulosis  ascending colon polyps x4 (cold snare x1)  transverse colon polyps x2 (cold snare x1)        Family History:  Family History   Problem Relation Age of Onset   • Diabetes Mother    • Thyroid disease Mother    • Hypertension Mother    • Cancer Mother          Colorectal (diagnosed at 84)   • Heart disease Father          in  at age 47   • Heart attack Father    • Diabetes Maternal Grandfather          in         Social History:   Social History     Tobacco Use   • Smoking status: Never   • Smokeless tobacco: Never   Substance Use Topics   • Alcohol use: Yes     Alcohol/week: 2.0 standard drinks     Types: 2 Cans of beer per week     Comment: Occasional craft beer        Allergies:  Allergies   Allergen Reactions   • Eggs-Apples-Oats [Alimentum] Anaphylaxis     Gums swell/itch        Home Meds:  (Not in a hospital admission)      Objective:    Vitals Ranges:   Heart Rate:  [64] 64  BP: (147)/(87) 147/87  Body mass index is 42.4 kg/m².     Exam:  General Appearance:  WDWN    HEENT:   without obvious abnormality,  Conjunctiva/corneas clear,  Normal external ear canals, no drainage    Clear orsalmucosa,  Mallampati score 3    Neck:  Supple, symmetrical, trachea midline. No JVD.  Lungs:   Bilateral basal rhonchi bilaterally, respirations unlabored symmetrical wall movement.    Chest wall:  No tenderness or deformity.    Heart:  Regular rate and rhythm, S1 and S2 normal.  Extremities: Trace edema no clubbing or Cyanosis        Data Review:  All labs (24hrs): No results found for this or any previous visit (from the past 24 hour(s)).     Imaging:  Home Sleep Study  Home Sleep Test Report  [facility]  Telephone: [phone] / [place]  Telephone: [phone] / Adrienne Garcia    Date of Study: 18  Interpreting Physician: MD Isaiah Monteiro III, MD    Clinical Information:  Fran Houser is a 52 y.o.male was [facility].   He reports increased snoring and apneas per his wife within past 1 year.   He had HST 6 years ago - no evidence CAROLYN. He sleeps from 12MN to 8AM. He   feels somewhat groggy when he awakens. He is somewhat refreshed. He feels   somewhat sleepy during day, denies drowsiness with  driving. He has gained   50lbs over past 5 years. He has frequent awakenings at night. He is not on   Percocet per patient.    Methods:   Patient had home sleep testing with a [employer] NightOne device. It measured   airflow at the nose and mouth. Oxygen saturation and pulse rate were   recorded by a pulse oximeter. Thoracic and/or abdominal respiratory   movements were recorded by respiratory inductive plethysmography. It   judged snoring by transducer vibration and also judged body position. The   sleep study was scored using standard techniques.     Procedure: Home sleep test was performed using a class III recording   device which monitored the following parameters; airflow, respiratory   effort, snore, heart rate and oximetry.   It measured airflow at the nose   and mouth. Oxygen saturation and pulse rate were recorded by a pulse   oximeter. Thoracic and/or abdominal respiratory movements were recorded by   respiratory inductive plethysmography. It judged snoring by transducer   vibration and also judged body position. Hypopneas were scored when there   was a peak signal excursions drop-off of > 30% of preevent baseline using   nasal pressure transducer lasting at least 10 seconds long and followed by   a > 3% oxygen desaturation from previous baseline for commercial insurance   patients and > 4% for medicare AAS VIII.1.D.1).    Diagnostic findings: The patient tolerated the home sleep testing with   monitoring time of 421 minutes. The data obtained make this a technically   adequate study. The apnea hypopneas index(AHI) was 16.1 per sleep hour.    The AHI during supine position was 27.7 per sleep hour. Mean heart rate of   56 BPM.  Snoring was noted 31% of sleep time. Lowest oxygen saturation   during the study was 83%. Saturation below 89% was noted for 3.6 mins.     Impression: This study revealed moderate obstructive sleep apnea syndrome.    During supine position almost severe obstructive sleep apnea is  present.    Significant snoring is noted also.  Recommend treatment with CPAP device.     Recommendations:   Auto CPAP setting 5-20 cm.  Follow-up sleep center.       ASSESSMENT:  Diagnoses and all orders for this visit:    Obstructive sleep apnea, adult    Non-seasonal allergic rhinitis, unspecified trigger    Essential hypertension        PLAN:    This patient with obstructive sleep apnea, compliance is improved. Encourage to use it more frequent, I re-emphasized on pt the long and short term benefit of treating CAROLYN. The device is benefiting the patient.  The patient is also instructed to get the CPAP supplies from the [employer] and see me in 1 year for follow-up.    Treating CAROLYN will improve BP control    It is recommended to keep treatment for allergic rhinitis optimized to improve quality of sleep    Follow-up 1 year    Katrina Mendes MD. D, ABSM.  10/11/2022  12:22 EDT

## 2022-12-12 ENCOUNTER — TELEPHONE (OUTPATIENT)
Dept: FAMILY MEDICINE CLINIC | Facility: CLINIC | Age: 57
End: 2022-12-12

## 2022-12-12 NOTE — TELEPHONE ENCOUNTER
Called pt and left VM regarding appt on 12/16/22 the appt will need to be rescheduled.    Ok for hub to read and schedule for next available.

## 2022-12-20 ENCOUNTER — LAB (OUTPATIENT)
Dept: LAB | Facility: HOSPITAL | Age: 57
End: 2022-12-20

## 2022-12-20 DIAGNOSIS — R73.01 IMPAIRED FASTING GLUCOSE: ICD-10-CM

## 2022-12-20 DIAGNOSIS — E78.00 PURE HYPERCHOLESTEROLEMIA: ICD-10-CM

## 2022-12-20 LAB
ALBUMIN SERPL-MCNC: 4.6 G/DL (ref 3.5–5.2)
ALBUMIN/GLOB SERPL: 1.8 G/DL
ALP SERPL-CCNC: 58 U/L (ref 39–117)
ALT SERPL W P-5'-P-CCNC: 84 U/L (ref 1–41)
ANION GAP SERPL CALCULATED.3IONS-SCNC: 7.2 MMOL/L (ref 5–15)
AST SERPL-CCNC: 48 U/L (ref 1–40)
BILIRUB SERPL-MCNC: 0.6 MG/DL (ref 0–1.2)
BUN SERPL-MCNC: 10 MG/DL (ref 6–20)
BUN/CREAT SERPL: 10.6 (ref 7–25)
CALCIUM SPEC-SCNC: 9.2 MG/DL (ref 8.6–10.5)
CHLORIDE SERPL-SCNC: 100 MMOL/L (ref 98–107)
CHOLEST SERPL-MCNC: 253 MG/DL (ref 0–200)
CO2 SERPL-SCNC: 28.8 MMOL/L (ref 22–29)
CREAT SERPL-MCNC: 0.94 MG/DL (ref 0.76–1.27)
EGFRCR SERPLBLD CKD-EPI 2021: 95.1 ML/MIN/1.73
GLOBULIN UR ELPH-MCNC: 2.6 GM/DL
GLUCOSE SERPL-MCNC: 114 MG/DL (ref 65–99)
HBA1C MFR BLD: 6.1 % (ref 4.8–5.6)
HDLC SERPL-MCNC: 42 MG/DL (ref 40–60)
LDLC SERPL CALC-MCNC: 189 MG/DL (ref 0–100)
LDLC/HDLC SERPL: 4.44 {RATIO}
POTASSIUM SERPL-SCNC: 4.5 MMOL/L (ref 3.5–5.2)
PROT SERPL-MCNC: 7.2 G/DL (ref 6–8.5)
SODIUM SERPL-SCNC: 136 MMOL/L (ref 136–145)
TRIGL SERPL-MCNC: 123 MG/DL (ref 0–150)
VLDLC SERPL-MCNC: 22 MG/DL (ref 5–40)

## 2022-12-20 PROCEDURE — 36415 COLL VENOUS BLD VENIPUNCTURE: CPT

## 2022-12-20 PROCEDURE — 83036 HEMOGLOBIN GLYCOSYLATED A1C: CPT

## 2022-12-20 PROCEDURE — 80053 COMPREHEN METABOLIC PANEL: CPT

## 2022-12-20 PROCEDURE — 80061 LIPID PANEL: CPT

## 2023-01-03 ENCOUNTER — OFFICE VISIT (OUTPATIENT)
Dept: FAMILY MEDICINE CLINIC | Facility: CLINIC | Age: 58
End: 2023-01-03
Payer: COMMERCIAL

## 2023-01-03 VITALS
WEIGHT: 305.2 LBS | TEMPERATURE: 97.1 F | HEIGHT: 73 IN | DIASTOLIC BLOOD PRESSURE: 78 MMHG | SYSTOLIC BLOOD PRESSURE: 130 MMHG | HEART RATE: 67 BPM | BODY MASS INDEX: 40.45 KG/M2 | OXYGEN SATURATION: 99 %

## 2023-01-03 DIAGNOSIS — R73.01 IMPAIRED FASTING GLUCOSE: ICD-10-CM

## 2023-01-03 DIAGNOSIS — E78.00 PURE HYPERCHOLESTEROLEMIA: ICD-10-CM

## 2023-01-03 DIAGNOSIS — I10 ESSENTIAL HYPERTENSION: Primary | ICD-10-CM

## 2023-01-03 PROCEDURE — 99214 OFFICE O/P EST MOD 30 MIN: CPT | Performed by: FAMILY MEDICINE

## 2023-01-03 RX ORDER — ATORVASTATIN CALCIUM 20 MG/1
20 TABLET, FILM COATED ORAL DAILY
Qty: 90 TABLET | Refills: 1 | Status: SHIPPED | OUTPATIENT
Start: 2023-01-03

## 2023-01-03 NOTE — PROGRESS NOTES
Chief Complaint  Hypertension    Subjective        Fran Houser presents to [facility] PRIMARY CARE  History of Present Illness     Hypertension follow-up.  Blood pressures running 135 systolic on average.  He is taking one half of the losartan hydrochlorothiazide.  He feels well overall.    Hyperlipidemia.  The LDL cholesterol is 189.  He has a family history of father who  related to heart disease in his 40s.  Patient does not smoke.  He has not been on statins before.    Impaired fasting glucose.  A1c now greater than 6%.  Fasting glucose less than 125.  But elevated.    Elevated liver enzymes.  Correlating with the LDL cholesterol and A1c.  In 2018 he had a CT scan which revealed hepatic steatosis.    Objective   Vital Signs:  /78   Pulse 67   Temp 97.1 °F (36.2 °C) (Temporal)   Ht 185 cm (72.84\")   Wt (!) 138 kg (305 lb 3.2 oz)   SpO2 99%   BMI 40.44 kg/m²   Estimated body mass index is 40.44 kg/m² as calculated from the following:    Height as of this encounter: 185 cm (72.84\").    Weight as of this encounter: 138 kg (305 lb 3.2 oz).    Class 3 Severe Obesity (BMI >=40). Obesity-related health conditions include the following: hypertension and dyslipidemias. Obesity is unchanged. BMI is is above average; BMI management plan is completed. We discussed portion control and increasing exercise.      Physical Exam   Result Review :  The following data was reviewed by: Jason Allison MD on 2023:  Common labs    Common Labs 22    1016 1016 1016 1016 1016 1050 1050 1050   Glucose  94     114 (A)    BUN  10     10    Creatinine  0.84     0.94    Sodium  138     136    Potassium  4.2     4.5    Chloride  104     100    Calcium  8.9     9.2    Albumin  4.10     4.60    Total Bilirubin  0.3     0.6    Alkaline Phosphatase  55     58    AST (SGOT)  44 (A)     48 (A)    ALT (SGPT)  72 (A)     84 (A)    WBC 5.53           Hemoglobin 13.8          Hematocrit 40.0          Platelets 241          Total Cholesterol   209 (A)   253 (A)     Triglycerides   131   123     HDL Cholesterol   37 (A)   42     LDL Cholesterol    148 (A)   189 (A)     Hemoglobin A1C    5.80 (A)    6.10 (A)   PSA     0.566      (A) Abnormal value                      Assessment and Plan   Diagnoses and all orders for this visit:    1. Essential hypertension (Primary)  -     Hemoglobin A1c; Future  -     Comprehensive Metabolic Panel; Future  -     Lipid Panel; Future    2. Impaired fasting glucose  -     Hemoglobin A1c; Future  -     Comprehensive Metabolic Panel; Future  -     Lipid Panel; Future    3. Pure hypercholesterolemia  -     Hemoglobin A1c; Future  -     Comprehensive Metabolic Panel; Future  -     Lipid Panel; Future    Other orders  -     atorvastatin (LIPITOR) 20 MG tablet; Take 1 tablet by mouth Daily.  Dispense: 90 tablet; Refill: 1      Hypertension.  Needs better control and improved.  He is going to go up to the full losartan hydrochlorothiazide tablet daily.  I reviewed his recent electrolytes and creatinine, normal.    Probable familial hyperlipidemia with a strong family history of heart disease.  I am recommending 20 mg of atorvastatin.  Target dose 40 mg.  Side effects discussed occluding muscle aches symptoms which are typically not an issue for most people.    Elevated liver enzymes.  Very likely hepatic steatosis/nonalcoholic fatty liver disease.  Discussed diet and exercise and decrease carbohydrates.  We will watch her liver enzymes with the atorvastatin.  Holding off further serology at this point.  But may need to be checked for hepatitis B and C if liver enzymes do not improve.    Impaired fasting glucose.  Obesity..  At risk for diabetes.  To consider semaglutide at next visit.         Follow Up   No follow-ups on file.  Patient was given instructions and counseling regarding his condition or for health maintenance advice. Please  see specific information pulled into the AVS if appropriate.       Answers for HPI/ROS submitted by the patient on 12/31/2022  What is the primary reason for your visit?: High Blood Pressure

## 2023-04-19 DIAGNOSIS — I10 ESSENTIAL HYPERTENSION: ICD-10-CM

## 2023-04-19 DIAGNOSIS — R73.01 IMPAIRED FASTING GLUCOSE: ICD-10-CM

## 2023-04-19 DIAGNOSIS — E78.00 PURE HYPERCHOLESTEROLEMIA: ICD-10-CM

## 2023-04-20 LAB
ALBUMIN SERPL-MCNC: 4.6 G/DL (ref 3.5–5.2)
ALBUMIN/GLOB SERPL: 2.2 G/DL
ALP SERPL-CCNC: 58 U/L (ref 39–117)
ALT SERPL-CCNC: 73 U/L (ref 1–41)
AST SERPL-CCNC: 45 U/L (ref 1–40)
BILIRUB SERPL-MCNC: 0.6 MG/DL (ref 0–1.2)
BUN SERPL-MCNC: 9 MG/DL (ref 6–20)
BUN/CREAT SERPL: 11.1 (ref 7–25)
CALCIUM SERPL-MCNC: 9.3 MG/DL (ref 8.6–10.5)
CHLORIDE SERPL-SCNC: 103 MMOL/L (ref 98–107)
CHOLEST SERPL-MCNC: 185 MG/DL (ref 0–200)
CO2 SERPL-SCNC: 25.9 MMOL/L (ref 22–29)
CREAT SERPL-MCNC: 0.81 MG/DL (ref 0.76–1.27)
EGFRCR SERPLBLD CKD-EPI 2021: 103.5 ML/MIN/1.73
GLOBULIN SER CALC-MCNC: 2.1 GM/DL
GLUCOSE SERPL-MCNC: 102 MG/DL (ref 65–99)
HBA1C MFR BLD: 6.3 % (ref 4.8–5.6)
HDLC SERPL-MCNC: 41 MG/DL (ref 40–60)
LDLC SERPL CALC-MCNC: 121 MG/DL (ref 0–100)
POTASSIUM SERPL-SCNC: 5 MMOL/L (ref 3.5–5.2)
PROT SERPL-MCNC: 6.7 G/DL (ref 6–8.5)
SODIUM SERPL-SCNC: 141 MMOL/L (ref 136–145)
TRIGL SERPL-MCNC: 130 MG/DL (ref 0–150)
VLDLC SERPL CALC-MCNC: 23 MG/DL (ref 5–40)

## 2023-04-21 ENCOUNTER — TELEPHONE (OUTPATIENT)
Dept: FAMILY MEDICINE CLINIC | Facility: CLINIC | Age: 58
End: 2023-04-21

## 2023-04-21 ENCOUNTER — OFFICE VISIT (OUTPATIENT)
Dept: FAMILY MEDICINE CLINIC | Facility: CLINIC | Age: 58
End: 2023-04-21
Payer: COMMERCIAL

## 2023-04-21 VITALS
WEIGHT: 308.2 LBS | DIASTOLIC BLOOD PRESSURE: 79 MMHG | TEMPERATURE: 96.6 F | SYSTOLIC BLOOD PRESSURE: 131 MMHG | HEART RATE: 63 BPM | HEIGHT: 73 IN | BODY MASS INDEX: 40.85 KG/M2 | OXYGEN SATURATION: 95 %

## 2023-04-21 DIAGNOSIS — R74.8 ELEVATED LIVER ENZYMES: ICD-10-CM

## 2023-04-21 DIAGNOSIS — E78.00 PURE HYPERCHOLESTEROLEMIA: ICD-10-CM

## 2023-04-21 DIAGNOSIS — R73.01 IMPAIRED FASTING GLUCOSE: ICD-10-CM

## 2023-04-21 DIAGNOSIS — I10 ESSENTIAL HYPERTENSION: Primary | ICD-10-CM

## 2023-04-21 PROCEDURE — 99214 OFFICE O/P EST MOD 30 MIN: CPT | Performed by: FAMILY MEDICINE

## 2023-04-21 NOTE — PROGRESS NOTES
"Chief Complaint  Hypertension and POISION OAK (LEFT GREAT AND 2ND GREAT TOE )    Subjective        Fran Houser presents to [facility]  History of Present Illness     Hypertension.  Continues losartan hydrochlorothiazide.  Blood pressure remains improved.  Recent lab work looked good.  Normal creatinine.  Normal potassium.    Hyperlipidemia.  Much improved lipids since starting atorvastatin 20 mg daily.  No side effects.  And the liver enzymes are also somewhat improved.  But the LDL cholesterol has dropped at least 30 to 40%.    Elevated liver enzymes.  CT scan from number of years ago demonstrated mild fatty liver.  I suspect ongoing fatty liver now.  No jaundice.    Impaired fasting glucose.  A1c up to 6.3%.  Fasting glucose levels are overall stable.    Obesity.  BMI 40    Objective   Vital Signs:  /79   Pulse 63   Temp 96.6 °F (35.9 °C) (Temporal)   Ht 185 cm (72.84\")   Wt (!) 140 kg (308 lb 3.2 oz)   SpO2 95%   BMI 40.84 kg/m²   Estimated body mass index is 40.84 kg/m² as calculated from the following:    Height as of this encounter: 185 cm (72.84\").    Weight as of this encounter: 140 kg (308 lb 3.2 oz).             Physical Exam  Vitals and nursing note reviewed.   Constitutional:       General: He is not in acute distress.     Appearance: He is well-developed. He is obese.   Cardiovascular:      Rate and Rhythm: Normal rate and regular rhythm.      Heart sounds: Normal heart sounds.   Pulmonary:      Effort: Pulmonary effort is normal.      Breath sounds: Normal breath sounds.   Musculoskeletal:      Cervical back: Normal range of motion.   Skin:     General: Skin is warm and dry.      Findings: Rash ( Localized bullous rash on the left foot.  Improving.  No impetigo changes.  No cellulitis.  Likely a contact dermatitis from the poison ivy or poison oak he got into in [facility] walking in his sandals.) present.   Psychiatric:         Mood and Affect: Mood " normal.        Result Review :  The following data was reviewed by: Jason Allison MD on 04/21/2023:  Common labs        8/17/2022    10:16 12/20/2022    10:50 4/19/2023    11:30   Common Labs   Glucose 94   114   102     BUN 10   10   9     Creatinine 0.84   0.94   0.81     Sodium 138   136   141     Potassium 4.2   4.5   5.0     Chloride 104   100   103     Calcium 8.9   9.2   9.3     Total Protein   6.7     Albumin 4.10   4.60   4.6     Total Bilirubin 0.3   0.6   0.6     Alkaline Phosphatase 55   58   58     AST (SGOT) 44   48   45     ALT (SGPT) 72   84   73     WBC 5.53       Hemoglobin 13.8       Hematocrit 40.0       Platelets 241       Total Cholesterol 209   253      Total Cholesterol   185     Triglycerides 131   123   130     HDL Cholesterol 37   42   41     LDL Cholesterol  148   189   121     Hemoglobin A1C 5.80   6.10   6.30     PSA 0.566                      Assessment and Plan   Diagnoses and all orders for this visit:    1. Essential hypertension (Primary)    2. Impaired fasting glucose    3. Pure hypercholesterolemia    4. Elevated liver enzymes  -     US Liver; Future  -     Hepatitis C antibody  -     Hepatitis B surface antigen    Other orders  -     triamcinolone (KENALOG) 0.1 % ointment; Apply 1 application topically to the appropriate area as directed 2 (Two) Times a Day.  Dispense: 80 g; Refill: 0        Hypertension.  Controlled.    Impaired fasting glucose.  Metabolic syndrome.  Continue to monitor.  See me in 3 months with labs prior.    Elevated liver enzymes.  Checking hepatitis B and C today.  Check liver ultrasound.  Likely fatty liver.  Diet and exercise discussed in detail.    Class III obesity.  Good candidate for GLP-1 agonist.  Discussed the pluses and minuses.  Is going to check with his insurance company.  I will see him back sooner than 3 months if he wants to start Wegovy.    Localized contact dermatitis likely poison oak or poison ivy.  Triamcinolone ointment.  Wash with  soap and water.  If getting worse in the next week please let me know.               Follow Up   No follow-ups on file.  Patient was given instructions and counseling regarding his condition or for health maintenance advice. Please see specific information pulled into the AVS if appropriate.

## 2023-04-22 LAB
HBV SURFACE AG SERPL QL IA: NEGATIVE
HCV IGG SERPL QL IA: NON REACTIVE

## 2023-05-17 RX ORDER — LOSARTAN POTASSIUM AND HYDROCHLOROTHIAZIDE 12.5; 5 MG/1; MG/1
1 TABLET ORAL DAILY
Qty: 90 TABLET | Refills: 1 | Status: SHIPPED | OUTPATIENT
Start: 2023-05-17

## 2023-05-17 NOTE — TELEPHONE ENCOUNTER
Rx Refill Note  Requested Prescriptions     Pending Prescriptions Disp Refills   • losartan-hydrochlorothiazide (Hyzaar) 50-12.5 MG per tablet 90 tablet 1     Sig: Take 1 tablet by mouth Daily.      Last office visit with prescribing clinician: 4/21/2023   Last telemedicine visit with prescribing clinician: 4/21/2023   Next office visit with prescribing clinician: 8/17/2023                         Would you like a call back once the refill request has been completed: [] Yes [] No    If the office needs to give you a call back, can they leave a voicemail: [] Yes [] No    Abdullahi Gr  05/17/23, 07:34 EDT

## 2023-05-19 ENCOUNTER — HOSPITAL ENCOUNTER (OUTPATIENT)
Dept: ULTRASOUND IMAGING | Facility: HOSPITAL | Age: 58
Discharge: HOME | End: 2023-05-19
Payer: COMMERCIAL

## 2023-05-19 DIAGNOSIS — R74.8 ELEVATED LIVER ENZYMES: ICD-10-CM

## 2023-05-19 PROCEDURE — 76705 ECHO EXAM OF ABDOMEN: CPT

## 2023-11-07 ENCOUNTER — OFFICE VISIT (OUTPATIENT)
Dept: SLEEP MEDICINE | Facility: HOSPITAL | Age: 58
End: 2023-11-07
Payer: COMMERCIAL

## 2023-11-07 VITALS
OXYGEN SATURATION: 99 % | WEIGHT: 305 LBS | HEIGHT: 73 IN | DIASTOLIC BLOOD PRESSURE: 86 MMHG | BODY MASS INDEX: 40.42 KG/M2 | SYSTOLIC BLOOD PRESSURE: 147 MMHG | HEART RATE: 52 BPM

## 2023-11-07 DIAGNOSIS — J30.89 NON-SEASONAL ALLERGIC RHINITIS, UNSPECIFIED TRIGGER: ICD-10-CM

## 2023-11-07 DIAGNOSIS — G47.33 OBSTRUCTIVE SLEEP APNEA, ADULT: Primary | Chronic | ICD-10-CM

## 2023-11-07 DIAGNOSIS — I10 ESSENTIAL HYPERTENSION: ICD-10-CM

## 2023-11-07 PROCEDURE — G0463 HOSPITAL OUTPT CLINIC VISIT: HCPCS

## 2023-11-07 NOTE — PROGRESS NOTES
SLEEP/PULMONARY  CLINIC NOTE      PATIENT IDENTIFICATION:  Name: Fran Houser  Age: 58 y.o.  Sex: male  :  1965  MRN: [id number]    DATE OF CONSULTATION:  2023                     CHIEF COMPLAINT: CAROLYN    History of Present Illness:   Fran Houser is a 58 y.o. male Pt on CPAP feeling better more energy especially the night he use it more than 4 hours, no sleepiness no fatigue no tiredness, no mask irritation no dryness, compliance report reviewed with pt d discussed with the patient the download data and encouarged the patient to continue to use the CPAP. The residual AHI is acceptable current machine is cutting off especially when he is traveling a lot.         Review of Systems:   Constitutional: negative   Eyes: negative   ENT/oropharynx: negative   Cardiovascular: negative   Respiratory: negative   Gastrointestinal: negative   Genitourinary: negative   Neurological: negative   Musculoskeletal: negative   Integument/breast: negative   Endocrine: negative   Allergic/Immunologic: negative     Past Medical History:  Past Medical History:   Diagnosis Date    Acute appendicitis with localized peritonitis 2018    Hypertension 2022    Slightly high. Only taking half a pill.    Sleep apnea        Past Surgical History:  Past Surgical History:   Procedure Laterality Date    APPENDECTOMY N/A 2018    Procedure: APPENDECTOMY LAPAROSCOPIC;  Surgeon: Alex Kuo MD;  Location: [facility];  Service: General    COLONOSCOPY  Oct. 2021 at [facility]     colonoscopy /  benign polyps removed    COLONOSCOPY W/ POLYPECTOMY N/A 10/21/2021    Procedure: COLONOSCOPY WITH POLYPECTOMY;  Surgeon: Elier Poon MD;  Location: [facility] OR;  Service: Gastroenterology;  Laterality: N/A;  poor prep  diverticulosis  ascending colon polyps x4 (cold snare x1)  transverse colon polyps x2 (cold snare x1)    SKIN CANCER EXCISION  2023        Family History:  Family History   Problem  Relation Age of Onset    Diabetes Mother     Thyroid disease Mother     Hypertension Mother     Cancer Mother         Colorectal (diagnosed at 84)    Heart disease Father          at 47 in . Arteriosclerosis.    Heart attack Father     Diabetes Maternal Grandfather          in         Social History:   Social History     Tobacco Use    Smoking status: Never    Smokeless tobacco: Never   Substance Use Topics    Alcohol use: Yes     Alcohol/week: 2.0 standard drinks of alcohol     Types: 2 Cans of beer per week     Comment: Occasional craft beer        Allergies:  Allergies   Allergen Reactions    Eggs-Apples-Oats [Alimentum] Anaphylaxis     Gums swell/itch        Home Meds:  (Not in a hospital admission)      Objective:    Vitals Ranges:   Heart Rate:  [52] 52  BP: (147)/(86) 147/86  Body mass index is 40.24 kg/m².     Exam:  General Appearance:  WDWN    HEENT:   without obvious abnormality,  Conjunctiva/corneas clear,  Normal external ear canals, no drainage    Clear orsalmucosa,  Mallampati score 3    Neck:  Supple, symmetrical, trachea midline. No JVD.  Lungs:   Bilateral basal rhonchi bilaterally, respirations unlabored symmetrical wall movement.    Chest wall:  No tenderness or deformity.    Heart:  Regular rate and rhythm, S1 and S2 normal.  Extremities: Trace edema no clubbing or Cyanosis        Data Review:  All labs (24hrs): No results found for this or any previous visit (from the past 24 hour(s)).     Imaging:  US Liver  Narrative: ULTRASOUND ABDOMEN, LIMITED, 2023     HISTORY:  57-year-old male with elevated liver enzymes     TECHNIQUE:  Grayscale ultrasound imaging of the right upper quadrant was performed.     FINDINGS:  Pancreas unremarkable as visualized but the majority of the body and  tail of the pancreas are obscured from view and not visualized or  assessed. Survey images of the liver show hepatic steatosis. There is no  liver mass or ascites. Unremarkable gallbladder. Small  area of focal  fatty sparing adjacent to the gallbladder. Patent main portal vein with  appropriate directional flow. Extrahepatic common bile duct measures 4  mm. Liver measures 16.8 cm. Nonobstructed right kidney measures 11.4 cm.     Impression:    1. Hepatic steatosis and focal fatty sparing adjacent to the  gallbladder.  2. No cholelithiasis or biliary ductal dilatation.  3. Limited evaluation of the pancreas.     This report was finalized on 5/19/2023 9:15 AM by Dr. Pepe Beckham MD.          ASSESSMENT:  Diagnoses and all orders for this visit:    Obstructive sleep apnea, adult    Essential hypertension    Non-seasonal allergic rhinitis, unspecified trigger        PLAN:  We will get a replacement CPAP or O2 CPAP    This patient with obstructive sleep apnea, compliance is improved. Encourage to use it more frequent, I re-emphasized on pt the long and short term benefit of treating CAROLYN. The device is benefiting the patient.  The patient is also instructed to get the CPAP supplies from the [facility] and see me in 1 year for follow-up.    Treating CAROLYN will improve BP control    It is recommended to keep allergic rhinitis treatment optimized to improve quality of sleep    Follow-up 1 year    Katrina Mendes MD. D, ABSM.  11/7/2023  11:08 EST

## 2023-11-08 ENCOUNTER — TELEPHONE (OUTPATIENT)
Dept: SLEEP MEDICINE | Facility: HOSPITAL | Age: 58
End: 2023-11-08
Payer: COMMERCIAL

## 2023-11-20 RX ORDER — LOSARTAN POTASSIUM AND HYDROCHLOROTHIAZIDE 12.5; 5 MG/1; MG/1
1 TABLET ORAL DAILY
Qty: 90 TABLET | Refills: 1 | Status: SHIPPED | OUTPATIENT
Start: 2023-11-20

## 2023-11-20 NOTE — TELEPHONE ENCOUNTER
Caller: Fran Houser    Relationship: Self    Best call back number: [phone]     Requested Prescriptions:   Requested Prescriptions     Pending Prescriptions Disp Refills    losartan-hydrochlorothiazide (Hyzaar) 50-12.5 MG per tablet 90 tablet 1     Sig: Take 1 tablet by mouth Daily.        Pharmacy where request should be sent: [facility]/PHARMACY #[id number] - [place], [place] - [address] [address] [address] [address] - [phone]  - [phone]      Last office visit with prescribing clinician: 4/21/2023   Last telemedicine visit with prescribing clinician: Visit date not found   Next office visit with prescribing clinician: Visit date not found     Additional details provided by patient: PATIENT HAS TWO DAYS LEFT OF MEDICATION    Does the patient have less than a 3 day supply:  [x] Yes  [] No    Would you like a call back once the refill request has been completed: [] Yes [x] No    If the office needs to give you a call back, can they leave a voicemail: [] Yes [x] No    Ernie Car Rep   11/20/23 14:43 EST

## 2023-11-20 NOTE — TELEPHONE ENCOUNTER
Rx Refill Note  Requested Prescriptions     Pending Prescriptions Disp Refills    losartan-hydrochlorothiazide (Hyzaar) 50-12.5 MG per tablet 90 tablet 1     Sig: Take 1 tablet by mouth Daily.      Last office visit with prescribing clinician: 4/21/2023   Last telemedicine visit with prescribing clinician: Visit date not found   Next office visit with prescribing clinician: Visit date not found                         Would you like a call back once the refill request has been completed: [] Yes [] No    If the office needs to give you a call back, can they leave a voicemail: [] Yes [] No    Abdullahi Gr  11/20/23, 15:59 EST

## 2024-01-02 RX ORDER — ATORVASTATIN CALCIUM 20 MG/1
TABLET, FILM COATED ORAL
Qty: 90 TABLET | Refills: 1 | Status: SHIPPED | OUTPATIENT
Start: 2024-01-02

## 2024-01-02 NOTE — TELEPHONE ENCOUNTER
Rx Refill Note  Requested Prescriptions     Pending Prescriptions Disp Refills    atorvastatin (LIPITOR) 20 MG tablet [Pharmacy Med Name: ATORVASTATIN 20 MG TABLET] 90 tablet 1     Sig: TAKE 1 TABLET BY MOUTH ONCE DAILY      Last office visit with prescribing clinician: 4/21/2023   Last telemedicine visit with prescribing clinician: Visit date not found   Next office visit with prescribing clinician: Visit date not found                         Would you like a call back once the refill request has been completed: [] Yes [] No    If the office needs to give you a call back, can they leave a voicemail: [] Yes [] No    Shekhar Palacios MA  01/02/24, 10:27 EST

## 2024-03-20 ENCOUNTER — TELEPHONE (OUTPATIENT)
Dept: FAMILY MEDICINE CLINIC | Facility: CLINIC | Age: 59
End: 2024-03-20
Payer: COMMERCIAL

## 2024-03-20 DIAGNOSIS — Z00.00 HEALTH CARE MAINTENANCE: Primary | ICD-10-CM

## 2024-03-20 DIAGNOSIS — R73.01 IMPAIRED FASTING GLUCOSE: ICD-10-CM

## 2024-03-20 DIAGNOSIS — Z12.5 SCREENING PSA (PROSTATE SPECIFIC ANTIGEN): ICD-10-CM

## 2024-03-20 NOTE — TELEPHONE ENCOUNTER
Pt's PHYSICAL is scheduled for Wed 06/05/24 @ 2 PM w/Dr. Allison.  Please Place LAB Orders.  Once LAB Orders are placed we will contact the Pt to schedule LAB Appt prior to visit.  Thank You.

## 2024-06-17 RX ORDER — LOSARTAN POTASSIUM AND HYDROCHLOROTHIAZIDE 12.5; 5 MG/1; MG/1
1 TABLET ORAL DAILY
Qty: 30 TABLET | Refills: 0 | Status: SHIPPED | OUTPATIENT
Start: 2024-06-17

## 2024-06-17 NOTE — TELEPHONE ENCOUNTER
Rx Refill Note  Requested Prescriptions     Pending Prescriptions Disp Refills    losartan-hydrochlorothiazide (HYZAAR) 50-12.5 MG per tablet [Pharmacy Med Name: LOSARTAN-HCTZ 50-12.5 MG TAB] 90 tablet 1     Sig: TAKE 1 TABLET BY MOUTH EVERY DAY      Last office visit with prescribing clinician: 4/21/2023   Last telemedicine visit with prescribing clinician: Visit date not found   Next office visit with prescribing clinician: Visit date not found                         Would you like a call back once the refill request has been completed: [] Yes [] No    If the office needs to give you a call back, can they leave a voicemail: [] Yes [] No    Abdullahi rG  06/17/24, 08:04 EDT

## 2024-06-17 NOTE — TELEPHONE ENCOUNTER
DELETE AFTER REVIEWING: Send the encounter HIGH priority, If patient has less than a 3 day supply. If the patient will run out of medication over the weekend add that information to the additional details line. Send to the appropriate pool. Check your call action grid or workflows.    Caller: [facility]/pharmacy #[id number] - [place], [place] - [address] [facility] [address] [phone] [address]    Relationship:     Requested Prescriptions:   Requested Prescriptions     Pending Prescriptions Disp Refills    losartan-hydrochlorothiazide (HYZAAR) 50-12.5 MG per tablet [Pharmacy Med Name: LOSARTAN-HCTZ 50-12.5 MG TAB] 90 tablet 1     Sig: TAKE 1 TABLET BY MOUTH EVERY DAY        Pharmacy where request should be sent: [facility]/PHARMACY #[id number] - [place], [place] - [address] [facility] [address] [phone] [address]     Last office visit with prescribing clinician: 4/21/2023   Last telemedicine visit with prescribing clinician: Visit date not found   Next office visit with prescribing clinician: Visit date not found       Does the patient have less than a 3 day supply:  [x] Yes  [] No      Ernie Pettit Rep   06/17/24 11:09 EDT

## 2024-07-11 ENCOUNTER — LAB (OUTPATIENT)
Dept: LAB | Facility: HOSPITAL | Age: 59
End: 2024-07-11
Payer: COMMERCIAL

## 2024-07-11 ENCOUNTER — OFFICE VISIT (OUTPATIENT)
Dept: FAMILY MEDICINE CLINIC | Facility: CLINIC | Age: 59
End: 2024-07-11
Payer: COMMERCIAL

## 2024-07-11 VITALS
HEART RATE: 57 BPM | DIASTOLIC BLOOD PRESSURE: 68 MMHG | RESPIRATION RATE: 17 BRPM | SYSTOLIC BLOOD PRESSURE: 128 MMHG | HEIGHT: 73 IN | OXYGEN SATURATION: 96 % | TEMPERATURE: 97.9 F | WEIGHT: 299 LBS | BODY MASS INDEX: 39.63 KG/M2

## 2024-07-11 DIAGNOSIS — Z00.00 ANNUAL PHYSICAL EXAM: Primary | ICD-10-CM

## 2024-07-11 DIAGNOSIS — R53.81 MALAISE AND FATIGUE: ICD-10-CM

## 2024-07-11 DIAGNOSIS — R73.9 ELEVATED BLOOD SUGAR: ICD-10-CM

## 2024-07-11 DIAGNOSIS — R53.83 MALAISE AND FATIGUE: ICD-10-CM

## 2024-07-11 PROCEDURE — G0103 PSA SCREENING: HCPCS | Performed by: FAMILY MEDICINE

## 2024-07-11 PROCEDURE — 83036 HEMOGLOBIN GLYCOSYLATED A1C: CPT | Performed by: FAMILY MEDICINE

## 2024-07-11 PROCEDURE — 80061 LIPID PANEL: CPT | Performed by: FAMILY MEDICINE

## 2024-07-11 PROCEDURE — 80053 COMPREHEN METABOLIC PANEL: CPT | Performed by: FAMILY MEDICINE

## 2024-07-11 PROCEDURE — 99396 PREV VISIT EST AGE 40-64: CPT | Performed by: FAMILY MEDICINE

## 2024-07-11 PROCEDURE — 85025 COMPLETE CBC W/AUTO DIFF WBC: CPT | Performed by: FAMILY MEDICINE

## 2024-07-11 NOTE — PROGRESS NOTES
"Chief Complaint  Chief Complaint   Patient presents with    Annual Exam     Physical        Subjective    History of Present Illness        Fran Houser presents to [facility] PRIMARY [facility]ARE for   History of Present Illness  Fran Houser is a 59 y.o. male here for his annual physical with me. Fran is here for coordination of medical care, to discuss health maintenance, disease prevention as well as to followup on medical problems. Patient has been followed by me since 2024. Patient's last CPE was 2023. Activity level is moderate. Exercises 3 per week. Appetite is good. Feels well with few complaints. Energy level is fair. Sleeps well with a cpap. Patient's last colonoscopy was 2021. He is advised to repeat in 3 years.      History of Present Illness      Objective   Vital Signs:   Visit Vitals  /68   Pulse 57   Temp 97.9 °F (36.6 °C)   Resp 17   Ht 185.4 cm (73\")   Wt 136 kg (299 lb)   SpO2 96%   BMI 39.45 kg/m²          Class 2 Severe Obesity (BMI >=35 and <=39.9). Obesity-related health conditions include the following: hypertension. Obesity is unchanged. BMI is is above average; BMI management plan is completed. We discussed portion control and increasing exercise.     Physical Exam  Vitals reviewed.   Constitutional:       Appearance: He is well-developed.   HENT:      Head: Normocephalic and atraumatic.      Nose: Nose normal.   Eyes:      General: Lids are normal.      Conjunctiva/sclera: Conjunctivae normal.      Pupils: Pupils are equal, round, and reactive to light.   Cardiovascular:      Rate and Rhythm: Normal rate and regular rhythm.      Pulses: Normal pulses.      Heart sounds: Normal heart sounds.   Pulmonary:      Effort: Pulmonary effort is normal. No respiratory distress.      Breath sounds: Normal breath sounds.   Abdominal:      General: Bowel sounds are normal.      Palpations: Abdomen is soft.   Musculoskeletal:         General: Normal range of motion.      Cervical back: " Normal range of motion and neck supple.   Skin:     General: Skin is warm and dry.      Capillary Refill: Capillary refill takes less than 2 seconds.   Neurological:      Mental Status: He is alert and oriented to person, place, and time.   Psychiatric:         Behavior: Behavior normal.         Thought Content: Thought content normal.         Judgment: Judgment normal.        Physical Exam           Result Review :  Results                            Assessment and Plan      Diagnoses and all orders for this visit:    1. Annual physical exam (Primary)  Assessment & Plan:  Discussed injury prevention, diet and exercise, safe sexual practices, and screening for common diseases. Encouraged use of sunscreen and seatbelts. Avoidance of tobacco encouraged. Limitation or avoidance of alcohol encouraged. Recommend yearly dental and eye exams. Also discussed monitoring of blood pressure, lipids.      2. Elevated blood sugar    3. Malaise and fatigue  -     Testosterone (Free & Total), LC / MS       Assessment & Plan             Follow Up   No follow-ups on file.  Patient was given instructions and counseling regarding his condition or for health maintenance advice. Please see specific information pulled into the AVS if appropriate.

## 2024-07-16 RX ORDER — LOSARTAN POTASSIUM AND HYDROCHLOROTHIAZIDE 12.5; 5 MG/1; MG/1
1 TABLET ORAL DAILY
Qty: 30 TABLET | Refills: 0 | Status: SHIPPED | OUTPATIENT
Start: 2024-07-16

## 2024-07-16 NOTE — TELEPHONE ENCOUNTER
Rx Refill Note  Requested Prescriptions     Pending Prescriptions Disp Refills    losartan-hydrochlorothiazide (HYZAAR) 50-12.5 MG per tablet [Pharmacy Med Name: LOSARTAN-HCTZ 50-12.5 MG TAB] 30 tablet 0     Sig: TAKE 1 TABLET BY MOUTH EVERY DAY      Last office visit with prescribing clinician: 7/11/2024   Last telemedicine visit with prescribing clinician: Visit date not found   Next office visit with prescribing clinician: 11/11/2024                         Would you like a call back once the refill request has been completed: [] Yes [] No    If the office needs to give you a call back, can they leave a voicemail: [] Yes [] No    Eli Ocasio MA  07/16/24, 13:07 EDT

## 2024-08-14 RX ORDER — LOSARTAN POTASSIUM AND HYDROCHLOROTHIAZIDE 12.5; 5 MG/1; MG/1
1 TABLET ORAL DAILY
Qty: 30 TABLET | Refills: 0 | Status: SHIPPED | OUTPATIENT
Start: 2024-08-14

## 2024-09-12 RX ORDER — LOSARTAN POTASSIUM AND HYDROCHLOROTHIAZIDE 12.5; 5 MG/1; MG/1
1 TABLET ORAL DAILY
Qty: 30 TABLET | Refills: 0 | Status: SHIPPED | OUTPATIENT
Start: 2024-09-12

## 2024-09-20 ENCOUNTER — TELEPHONE (OUTPATIENT)
Dept: GASTROENTEROLOGY | Facility: CLINIC | Age: 59
End: 2024-09-20
Payer: COMMERCIAL

## 2024-09-20 DIAGNOSIS — Z86.010 PERSONAL HISTORY OF COLONIC POLYPS: Primary | ICD-10-CM

## 2024-09-20 PROBLEM — Z86.0100 PERSONAL HISTORY OF COLONIC POLYPS: Status: ACTIVE | Noted: 2024-09-20

## 2024-10-14 RX ORDER — LOSARTAN POTASSIUM AND HYDROCHLOROTHIAZIDE 12.5; 5 MG/1; MG/1
1 TABLET ORAL DAILY
Qty: 30 TABLET | Refills: 0 | Status: SHIPPED | OUTPATIENT
Start: 2024-10-14

## 2024-10-25 ENCOUNTER — TELEPHONE (OUTPATIENT)
Dept: GASTROENTEROLOGY | Facility: CLINIC | Age: 59
End: 2024-10-25

## 2024-10-25 ENCOUNTER — TELEPHONE (OUTPATIENT)
Dept: GASTROENTEROLOGY | Facility: CLINIC | Age: 59
End: 2024-10-25
Payer: COMMERCIAL

## 2024-10-25 NOTE — TELEPHONE ENCOUNTER
Caller: Fran Houser    Relationship: Self    Best call back number: [phone]    What is the best time to reach you: ANYTIME    Who are you requesting to speak with (clinical staff, provider,  specific staff member): SCHEDULING    What was the call regarding: PT IS NEEDING TO CANCEL HIS PROCEDURE. IT'S OK TO

## 2024-10-25 NOTE — TELEPHONE ENCOUNTER
Caller: JAE QUINTERO    Relationship: SELF    Best call back number: [phone]     Who is your current provider: DR INOCENCIO GLEZ     Is your current provider offboarding? NO    Who would you like your new provider to be: DR WAYLON FREGOSO     What are your reasons for transferring care: PT JUST LIKES OFFICE BETTER AND HIS WIFE GOES TO DR FREGOSO     Additional notes:

## 2024-11-18 RX ORDER — LOSARTAN POTASSIUM AND HYDROCHLOROTHIAZIDE 12.5; 5 MG/1; MG/1
1 TABLET ORAL DAILY
Qty: 30 TABLET | Refills: 0 | Status: SHIPPED | OUTPATIENT
Start: 2024-11-18

## 2024-11-26 ENCOUNTER — OFFICE VISIT (OUTPATIENT)
Dept: SLEEP MEDICINE | Facility: HOSPITAL | Age: 59
End: 2024-11-26
Payer: COMMERCIAL

## 2024-11-26 VITALS
OXYGEN SATURATION: 98 % | SYSTOLIC BLOOD PRESSURE: 144 MMHG | HEART RATE: 55 BPM | WEIGHT: 305 LBS | HEIGHT: 73 IN | DIASTOLIC BLOOD PRESSURE: 79 MMHG | BODY MASS INDEX: 40.42 KG/M2

## 2024-11-26 DIAGNOSIS — G47.33 OBSTRUCTIVE SLEEP APNEA, ADULT: Primary | Chronic | ICD-10-CM

## 2024-11-26 DIAGNOSIS — I10 ESSENTIAL HYPERTENSION: ICD-10-CM

## 2024-11-26 DIAGNOSIS — J30.89 NON-SEASONAL ALLERGIC RHINITIS, UNSPECIFIED TRIGGER: ICD-10-CM

## 2024-11-26 PROCEDURE — G0463 HOSPITAL OUTPT CLINIC VISIT: HCPCS

## 2024-11-26 NOTE — PROGRESS NOTES
SLEEP/PULMONARY  CLINIC NOTE      PATIENT IDENTIFICATION:  Name: Fran Houser  Age: 59 y.o.  Sex: male  :  1965  MRN: [id number]    DATE OF CONSULTATION:  2024     Referring physician:    Rigo Mccallum Sr., MD            CHIEF COMPLAINT: CAROLYN    History of Present Illness:   Fran Houser is a 59 y.o. male Pt on CPAP feeling better more energy especially the night he use it more than 4 hours, no sleepiness no fatigue no tiredness, no mask irritation no dryness, compliance report reviewed with pt d discussed with the patient the download data and encouarged the patient to continue to use the CPAP. The residual AHI is acceptable.         Review of Systems:   Constitutional: negative   Eyes: negative   ENT/oropharynx: negative   Cardiovascular: negative   Respiratory: negative   Gastrointestinal: negative   Genitourinary: negative   Neurological: negative   Musculoskeletal: negative   Integument/breast: negative   Endocrine: negative   Allergic/Immunologic: negative     Past Medical History:  Past Medical History:   Diagnosis Date    Acute appendicitis with localized peritonitis 2018    Hypertension 2022    Slightly high. Only taking half a pill.    Obesity     Sleep apnea        Past Surgical History:  Past Surgical History:   Procedure Laterality Date    APPENDECTOMY N/A 2018    Procedure: APPENDECTOMY LAPAROSCOPIC;  Surgeon: Alex Kuo MD;  Location: [facility] OR;  Service: General    COLONOSCOPY  Oct. 2021 at [facility]     colonoscopy /  benign polyps removed    COLONOSCOPY W/ POLYPECTOMY N/A 10/21/2021    Procedure: COLONOSCOPY WITH POLYPECTOMY;  Surgeon: Elier Poon MD;  Location: [facility] OR;  Service: Gastroenterology;  Laterality: N/A;  poor prep  diverticulosis  ascending colon polyps x4 (cold snare x1)  transverse colon polyps x2 (cold snare x1)    SKIN CANCER EXCISION  2023    SKIN CANCER EXCISION  2024    face        Family  History:  Family History   Problem Relation Age of Onset    Diabetes Mother     Thyroid disease Mother     Hypertension Mother     Cancer Mother         Colorectal (diagnosed at 84)    Heart disease Father          at 47 in . Arteriosclerosis.    Heart attack Father     Diabetes Maternal Grandfather          in         Social History:   Social History     Tobacco Use    Smoking status: Never    Smokeless tobacco: Never   Substance Use Topics    Alcohol use: Yes     Alcohol/week: 2.0 standard drinks of alcohol     Types: 2 Cans of beer per week     Comment: Occasional craft beer        Allergies:  Allergies   Allergen Reactions    Eggs-Apples-Oats [Alimentum] Anaphylaxis     Gums swell/itch        Home Meds:  (Not in a hospital admission)      Objective:    Vitals Ranges:   Heart Rate:  [55] 55  BP: (144)/(79) 144/79  Body mass index is 40.24 kg/m².     Exam:  General Appearance:  WDWN morbidly obese  HEENT:   without obvious abnormality,  Conjunctiva/corneas clear,  Normal external ear canals, no drainage    Clear orsalmucosa,  Mallampati score 3    Neck:  Supple, symmetrical, trachea midline. No JVD.  Lungs:   Bilateral basal rhonchi bilaterally, respirations unlabored symmetrical wall movement.    Chest wall:  No tenderness or deformity.    Heart:  Regular rate and rhythm, S1 and S2 normal.  Extremities: Trace edema no clubbing or Cyanosis        Data Review:  All labs (24hrs): No results found for this or any previous visit (from the past 24 hours).     Imaging:  US Liver  Narrative: ULTRASOUND ABDOMEN, LIMITED, 2023     HISTORY:  57-year-old male with elevated liver enzymes     TECHNIQUE:  Grayscale ultrasound imaging of the right upper quadrant was performed.     FINDINGS:  Pancreas unremarkable as visualized but the majority of the body and  tail of the pancreas are obscured from view and not visualized or  assessed. Survey images of the liver show hepatic steatosis. There is no  liver  mass or ascites. Unremarkable gallbladder. Small area of focal  fatty sparing adjacent to the gallbladder. Patent main portal vein with  appropriate directional flow. Extrahepatic common bile duct measures 4  mm. Liver measures 16.8 cm. Nonobstructed right kidney measures 11.4 cm.     Impression:    1. Hepatic steatosis and focal fatty sparing adjacent to the  gallbladder.  2. No cholelithiasis or biliary ductal dilatation.  3. Limited evaluation of the pancreas.     This report was finalized on 5/19/2023 9:15 AM by Dr. Pepe Beckham MD.          ASSESSMENT:  Diagnoses and all orders for this visit:    Obstructive sleep apnea, adult    Essential hypertension    Non-seasonal allergic rhinitis, unspecified trigger    Morbidly obese    PLAN:    This patient with obstructive sleep apnea, compliance is improved. Encourage to use it more frequent, I re-emphasized on pt the long and short term benefit of treating CAROLYN. The device is benefiting the patient.  The patient is also instructed to get the CPAP supplies from the [employer] and see me in 1 year for follow-up.    Treating CAROLYN will improve BP control    It is recommended to keep treatment of allergic rhinitis optimized to improve quality of sleep  Follow-up with the radiation  Follow-up 1 year    Katrina Mendes MD. D, ABSM.  11/26/2024  10:53 EST

## 2024-12-18 ENCOUNTER — OFFICE VISIT (OUTPATIENT)
Dept: GASTROENTEROLOGY | Facility: CLINIC | Age: 59
End: 2024-12-18
Payer: COMMERCIAL

## 2024-12-18 ENCOUNTER — PREP FOR SURGERY (OUTPATIENT)
Dept: SURGERY | Facility: SURGERY CENTER | Age: 59
End: 2024-12-18
Payer: COMMERCIAL

## 2024-12-18 VITALS
DIASTOLIC BLOOD PRESSURE: 90 MMHG | WEIGHT: 305 LBS | HEIGHT: 73 IN | HEART RATE: 64 BPM | SYSTOLIC BLOOD PRESSURE: 140 MMHG | OXYGEN SATURATION: 99 % | BODY MASS INDEX: 40.42 KG/M2 | TEMPERATURE: 96.1 F

## 2024-12-18 DIAGNOSIS — Z86.0100 HISTORY OF COLON POLYPS: Primary | ICD-10-CM

## 2024-12-18 DIAGNOSIS — Z12.11 ENCOUNTER FOR SCREENING FOR MALIGNANT NEOPLASM OF COLON: Primary | ICD-10-CM

## 2024-12-18 DIAGNOSIS — Z80.0 FAMILY HISTORY OF COLON CANCER IN MOTHER: ICD-10-CM

## 2024-12-18 DIAGNOSIS — Z86.0100 HISTORY OF COLON POLYPS: ICD-10-CM

## 2024-12-18 DIAGNOSIS — Z12.11 ENCOUNTER FOR SCREENING FOR MALIGNANT NEOPLASM OF COLON: ICD-10-CM

## 2024-12-18 RX ORDER — SODIUM CHLORIDE 0.9 % (FLUSH) 0.9 %
3 SYRINGE (ML) INJECTION EVERY 12 HOURS SCHEDULED
OUTPATIENT
Start: 2024-12-18

## 2024-12-18 RX ORDER — SODIUM CHLORIDE, SODIUM LACTATE, POTASSIUM CHLORIDE, CALCIUM CHLORIDE 600; 310; 30; 20 MG/100ML; MG/100ML; MG/100ML; MG/100ML
30 INJECTION, SOLUTION INTRAVENOUS CONTINUOUS PRN
OUTPATIENT
Start: 2024-12-18 | End: 2024-12-18

## 2024-12-18 RX ORDER — SODIUM CHLORIDE 0.9 % (FLUSH) 0.9 %
10 SYRINGE (ML) INJECTION AS NEEDED
OUTPATIENT
Start: 2024-12-18

## 2024-12-18 NOTE — PROGRESS NOTES
"Chief Complaint   Patient presents with    Colon Polyps         History of Present Illness  Patient is a 59-year-old male who presents today for evaluation.  He has a history of colon polyps and a family history of colon cancer.  Most recent colonoscopy in 2021 with recommendation to repeat in 3 years.    Patient presents today for evaluation.  He has a family history of colon cancer in his mother, diagnosed at age 85.  His most recent colonoscopy was in 2021, he had 6 polyps removed at the time which were a combination of hyperplastic, adenomatous, and sessile serrated lesions.  He also had diverticulosis.    He denies any heartburn, reflux, nausea, vomiting, or dysphagia.  Denies any abdominal pain or bowel complaints.     Result Review :       Tissue Pathology Exam (10/21/2021 08:37)    Surgery with Ayah, Elier Cotton MD (10/21/2021)    US Liver (05/19/2023 09:09)    Vital Signs:   /90   Pulse 64   Temp 96.1 °F (35.6 °C)   Ht 185.4 cm (72.99\")   Wt (!) 138 kg (305 lb)   SpO2 99%   BMI 40.25 kg/m²     Body mass index is 40.25 kg/m².     Physical Exam  Vitals reviewed.   Constitutional:       General: He is not in acute distress.     Appearance: He is well-developed.   HENT:      Head: Normocephalic and atraumatic.   Pulmonary:      Effort: Pulmonary effort is normal. No respiratory distress.   Abdominal:      General: Abdomen is protuberant. Bowel sounds are normal.      Palpations: Abdomen is soft.      Tenderness: There is no abdominal tenderness.   Skin:     General: Skin is dry.      Coloration: Skin is not pale.   Neurological:      Mental Status: He is alert and oriented to person, place, and time.   Psychiatric:         Thought Content: Thought content normal.           Assessment and Plan    Diagnoses and all orders for this visit:    1. History of colon polyps (Primary)    2. Encounter for screening for malignant neoplasm of colon    3. Family history of colon cancer in mother     "     Discussion  Patient presents today for evaluation.  He has a history of colon polyps and a family history of colon cancer.  He is due for colonoscopy for surveillance of polyps which we will arrange to be completed.  Discussed with him today if he only has 1 or 2 small polyps on this exam or no polyps we can transition to a 5-year surveillance interval moving forward.  If he again has multiple polyps, will likely stick with a 3-year surveillance.  Will provide further recommendations dependent upon colonoscopy findings.          Follow Up   Return if symptoms worsen or fail to improve.    Patient Instructions   Schedule colonoscopy for surveillance of colon polyps

## 2024-12-19 RX ORDER — LOSARTAN POTASSIUM AND HYDROCHLOROTHIAZIDE 12.5; 5 MG/1; MG/1
1 TABLET ORAL DAILY
Qty: 30 TABLET | Refills: 0 | Status: SHIPPED | OUTPATIENT
Start: 2024-12-19

## 2025-01-08 ENCOUNTER — TELEPHONE (OUTPATIENT)
Dept: GASTROENTEROLOGY | Facility: CLINIC | Age: 60
End: 2025-01-08
Payer: COMMERCIAL

## 2025-01-08 NOTE — TELEPHONE ENCOUNTER
"Pt left the following [employer] message on 12/29/2024 at 4:59pm...    \"Today is Sunday. This is Fran Paylor, and I'm calling because I'm not feeling well, and I have a colonoscopy scheduled for this Friday that I need to cancel. I'd like to reschedule that for when I have more energy and I feel much better. Thank you.\"  "

## 2025-01-20 RX ORDER — LOSARTAN POTASSIUM AND HYDROCHLOROTHIAZIDE 12.5; 5 MG/1; MG/1
1 TABLET ORAL DAILY
Qty: 30 TABLET | Refills: 0 | Status: SHIPPED | OUTPATIENT
Start: 2025-01-20 | End: 2025-01-23 | Stop reason: SDUPTHER

## 2025-01-21 ENCOUNTER — LAB (OUTPATIENT)
Dept: LAB | Facility: HOSPITAL | Age: 60
End: 2025-01-21
Payer: COMMERCIAL

## 2025-01-21 PROCEDURE — 84402 ASSAY OF FREE TESTOSTERONE: CPT | Performed by: FAMILY MEDICINE

## 2025-01-21 PROCEDURE — 84403 ASSAY OF TOTAL TESTOSTERONE: CPT | Performed by: FAMILY MEDICINE

## 2025-01-23 ENCOUNTER — OFFICE VISIT (OUTPATIENT)
Dept: FAMILY MEDICINE CLINIC | Facility: CLINIC | Age: 60
End: 2025-01-23
Payer: COMMERCIAL

## 2025-01-23 VITALS
DIASTOLIC BLOOD PRESSURE: 70 MMHG | SYSTOLIC BLOOD PRESSURE: 132 MMHG | WEIGHT: 302 LBS | RESPIRATION RATE: 17 BRPM | OXYGEN SATURATION: 98 % | HEART RATE: 76 BPM | BODY MASS INDEX: 40.02 KG/M2 | HEIGHT: 73 IN

## 2025-01-23 DIAGNOSIS — I10 ESSENTIAL HYPERTENSION: Primary | ICD-10-CM

## 2025-01-23 PROCEDURE — 99213 OFFICE O/P EST LOW 20 MIN: CPT | Performed by: FAMILY MEDICINE

## 2025-01-23 RX ORDER — LOSARTAN POTASSIUM AND HYDROCHLOROTHIAZIDE 12.5; 5 MG/1; MG/1
1 TABLET ORAL DAILY
Qty: 90 TABLET | Refills: 3 | Status: SHIPPED | OUTPATIENT
Start: 2025-01-23

## 2025-01-23 NOTE — PROGRESS NOTES
"Chief Complaint  Chief Complaint   Patient presents with    Hypertension     4 mon f/u        Subjective    History of Present Illness        Fran Houser presents to [facility] for     Check up  Pertinent negative symptoms include no abdominal pain, no anorexia, no joint pain, no change in stool, no chest pain, no chills, no congestion, no cough, no diaphoresis, no fatigue, no fever, no headaches, no joint swelling, no myalgias, no nausea, no neck pain, no numbness, no rash, no sore throat, no swollen glands, no dysuria, no vertigo, no visual change, no vomiting and no weakness.   Hypertension  This is a chronic problem. The current episode started more than 1 year ago. The problem has been stable since onset. The problem is controlled. Pertinent negatives include no anxiety, chest pain, headaches, neck pain or shortness of breath. Risk factors for coronary artery disease include male gender, obesity and family history. Current antihypertension treatment includes angiotensin blockers and diuretics. The current treatment provides significant improvement. There are no compliance problems.  There is no history of coarctation of the aorta, hyperaldosteronism, hypercortisolism, hyperparathyroidism or pheochromocytoma.      History of Present Illness      Objective   Vital Signs:   Visit Vitals  /70   Pulse 76   Resp 17   Ht 185.4 cm (72.99\")   Wt (!) 137 kg (302 lb)   SpO2 98%   BMI 39.86 kg/m²                Physical Exam  Vitals reviewed.   Constitutional:       Appearance: He is well-developed.   HENT:      Head: Normocephalic.      Right Ear: External ear normal.      Left Ear: External ear normal.      Nose: Nose normal.   Eyes:      Conjunctiva/sclera: Conjunctivae normal.   Cardiovascular:      Rate and Rhythm: Normal rate and regular rhythm.   Pulmonary:      Effort: Pulmonary effort is normal.      Breath sounds: Normal breath sounds.   Musculoskeletal:         General: " Normal range of motion.      Cervical back: Normal range of motion and neck supple.   Skin:     General: Skin is warm and dry.      Capillary Refill: Capillary refill takes less than 2 seconds.   Neurological:      Mental Status: He is alert and oriented to person, place, and time.        Physical Exam           Result Review :  Results                            Assessment and Plan      Diagnoses and all orders for this visit:    1. Essential hypertension (Primary)  Assessment & Plan:  Hypertension is stable and controlled  Continue current treatment regimen.  Dietary sodium restriction.  Weight loss.  Regular aerobic exercise.  Blood pressure will be reassessed  4 months .    Orders:  -     losartan-hydrochlorothiazide (HYZAAR) 50-12.5 MG per tablet; Take 1 tablet by mouth Daily.  Dispense: 90 tablet; Refill: 3       Assessment & Plan             Follow Up   No follow-ups on file.  Patient was given instructions and counseling regarding his condition or for health maintenance advice. Please see specific information pulled into the AVS if appropriate.

## 2025-01-23 NOTE — ASSESSMENT & PLAN NOTE
Hypertension is stable and controlled  Continue current treatment regimen.  Dietary sodium restriction.  Weight loss.  Regular aerobic exercise.  Blood pressure will be reassessed  4 months .

## 2025-01-29 LAB
TESTOST FREE SERPL-MCNC: 4.7 PG/ML (ref 7.2–24)
TESTOST SERPL-MCNC: 289.8 NG/DL (ref 264–916)

## 2025-03-24 ENCOUNTER — TELEPHONE (OUTPATIENT)
Dept: GASTROENTEROLOGY | Facility: CLINIC | Age: 60
End: 2025-03-24
Payer: COMMERCIAL

## 2025-03-24 NOTE — TELEPHONE ENCOUNTER
"Pt left the following [employer] message on 03/22/2025 at 9:19pm...    \"Yes, this is Fran Oropeza. I am scheduled with Dr. Augustin for a colonoscopy on Tuesday. I'm leaving this message Saturday night at approximately 9:30 p.m. I have a family emergency in [place], [place], and I will not be able to make that appointment, unfortunately. I would like to reschedule. I do have the 's number. I will call that number and leave a message as well. Thank you.\"        "

## 2025-03-24 NOTE — TELEPHONE ENCOUNTER
PT'S 3/25/25 CLS WITH DR. FREGOSO HAS BEEN CANCELLED DUE TO A FAMILY EMERGENCY. HE WILL RESCHEDULE AT A LATER DATE. Per Leah

## 2025-04-04 ENCOUNTER — TELEPHONE (OUTPATIENT)
Dept: GASTROENTEROLOGY | Facility: CLINIC | Age: 60
End: 2025-04-04
Payer: COMMERCIAL

## 2025-04-11 ENCOUNTER — TELEPHONE (OUTPATIENT)
Dept: GASTROENTEROLOGY | Facility: CLINIC | Age: 60
End: 2025-04-11
Payer: COMMERCIAL

## 2025-04-11 NOTE — TELEPHONE ENCOUNTER
"Pt left the following [employer] message on 04/10/2025 at 4:31pm...    \"If I need the  to give me a call back, please. And this, again, is Fran Sandip, [phone], date of birth, 1965. I called a week ago to get my colonoscopy rescheduled and still have not had a call back. I would appreciate a call at the soonest available time. Thank you, take care.\"    Second call to reschedule. See 4/4/25 telephone encounter.  "

## 2025-05-14 ENCOUNTER — PATIENT MESSAGE (OUTPATIENT)
Dept: FAMILY MEDICINE CLINIC | Facility: CLINIC | Age: 60
End: 2025-05-14
Payer: COMMERCIAL
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
## 2025-05-14 RX ORDER — DOXYCYCLINE 100 MG/1
100 CAPSULE ORAL 2 TIMES DAILY
Qty: 20 CAPSULE | Refills: 0 | Status: SHIPPED | OUTPATIENT
Start: 2025-05-14 | End: 2025-05-24

## 2025-05-14 RX ORDER — DOXYCYCLINE 100 MG/1
100 CAPSULE ORAL 2 TIMES DAILY
Qty: 20 CAPSULE | Refills: 0 | Status: SHIPPED | OUTPATIENT
Start: 2025-05-14 | End: 2025-05-14 | Stop reason: SDUPTHER

## 2025-05-23 ENCOUNTER — TELEPHONE (OUTPATIENT)
Dept: FAMILY MEDICINE CLINIC | Facility: CLINIC | Age: 60
End: 2025-05-23
Payer: COMMERCIAL

## 2025-05-23 DIAGNOSIS — I10 ESSENTIAL HYPERTENSION: Primary | ICD-10-CM

## 2025-05-23 DIAGNOSIS — R73.9 ELEVATED BLOOD SUGAR: ICD-10-CM

## 2025-05-23 NOTE — TELEPHONE ENCOUNTER
"Pt is also asking for \"tick born disease\" labs to be added given recent tick bites he has had . Please add if appropriate   "

## 2025-05-29 ENCOUNTER — LAB (OUTPATIENT)
Dept: LAB | Facility: HOSPITAL | Age: 60
End: 2025-05-29
Payer: COMMERCIAL

## 2025-05-29 PROCEDURE — 80061 LIPID PANEL: CPT | Performed by: FAMILY MEDICINE

## 2025-05-29 PROCEDURE — 80050 GENERAL HEALTH PANEL: CPT | Performed by: FAMILY MEDICINE

## 2025-05-29 PROCEDURE — 83036 HEMOGLOBIN GLYCOSYLATED A1C: CPT | Performed by: FAMILY MEDICINE

## 2025-05-30 ENCOUNTER — OFFICE VISIT (OUTPATIENT)
Dept: FAMILY MEDICINE CLINIC | Facility: CLINIC | Age: 60
End: 2025-05-30
Payer: COMMERCIAL

## 2025-05-30 VITALS
DIASTOLIC BLOOD PRESSURE: 72 MMHG | WEIGHT: 304 LBS | OXYGEN SATURATION: 97 % | TEMPERATURE: 98.4 F | HEART RATE: 60 BPM | SYSTOLIC BLOOD PRESSURE: 126 MMHG | HEIGHT: 73 IN | BODY MASS INDEX: 40.29 KG/M2 | RESPIRATION RATE: 18 BRPM

## 2025-05-30 DIAGNOSIS — E78.2 MIXED HYPERLIPIDEMIA: ICD-10-CM

## 2025-05-30 DIAGNOSIS — I10 ESSENTIAL HYPERTENSION: Primary | ICD-10-CM

## 2025-05-30 DIAGNOSIS — Z12.5 SCREENING FOR PROSTATE CANCER: ICD-10-CM

## 2025-05-30 DIAGNOSIS — R79.89 LOW TESTOSTERONE: ICD-10-CM
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
## 2025-05-30 RX ORDER — DOXYCYCLINE HYCLATE 100 MG
100 TABLET ORAL 2 TIMES DAILY
Qty: 28 TABLET | Refills: 0 | Status: SHIPPED | OUTPATIENT
Start: 2025-05-30 | End: 2025-06-13

## 2025-05-30 RX ORDER — TESTOSTERONE 20.25 MG/1.25G
1 GEL TOPICAL DAILY
Qty: 38 G | Refills: 0 | Status: SHIPPED | OUTPATIENT
Start: 2025-05-30 | End: 2025-06-08 | Stop reason: SDUPTHER

## 2025-05-30 NOTE — PROGRESS NOTES
"Chief Complaint  Chief Complaint   Patient presents with    Hypertension     Pt here for 4 mon f/u        Subjective    History of Present Illness        Fran Houser presents to [facility] for   Hypertension  Chronicity:  Chronic  Onset:  More than 1 year ago  Progression since onset:  Stable  Condition status:  Controlled  Associated symptoms: no chest pain, no headaches and no neck pain    CAD risks:  Family history, dyslipidemia, obesity and male gender  Improvement on treatment:  Significant  Compliance problems:  No compliance problems  Identifiable causes: no coarctation of the aorta, no hyperaldosteronism, no hypercortisolism, no hyperparathyroidism and no pheochromocytoma      Check up with labs  Pertinent negative symptoms include no abdominal pain, no anorexia, no joint pain, no change in stool, no chest pain, no chills, no congestion, no cough, no diaphoresis, no fatigue, no fever, no headaches, no joint swelling, no myalgias, no nausea, no neck pain, no numbness, no rash, no sore throat, no swollen glands, no dysuria, no vertigo, no visual change, no vomiting and no weakness.   Hyperlipidemia  This is a chronic problem. The current episode started more than 1 year ago. The problem is uncontrolled. Recent lipid tests were reviewed and are high. Exacerbating diseases include obesity. Pertinent negatives include no chest pain or myalgias. He is currently on no antihyperlipidemic treatment. The current treatment provides no improvement of lipids. Risk factors for coronary artery disease include male sex, hypertension, dyslipidemia, family history and obesity.      History of Present Illness      Objective   Vital Signs:   Visit Vitals  /72   Pulse 60   Temp 98.4 °F (36.9 °C)   Resp 18   Ht 185.4 cm (72.99\")   Wt (!) 138 kg (304 lb)   SpO2 97%   BMI 40.12 kg/m²                Physical Exam  Vitals reviewed.   Constitutional:       Appearance: He is well-developed. "   HENT:      Head: Normocephalic.      Right Ear: External ear normal.      Left Ear: External ear normal.      Nose: Nose normal.   Eyes:      Conjunctiva/sclera: Conjunctivae normal.   Cardiovascular:      Rate and Rhythm: Normal rate and regular rhythm.   Pulmonary:      Effort: Pulmonary effort is normal.      Breath sounds: Normal breath sounds.   Musculoskeletal:         General: Normal range of motion.      Cervical back: Normal range of motion and neck supple.   Skin:     General: Skin is warm and dry.      Capillary Refill: Capillary refill takes less than 2 seconds.   Neurological:      Mental Status: He is alert and oriented to person, place, and time.        Physical Exam           Result Review :  Results                            Assessment and Plan      Diagnoses and all orders for this visit:    1. Essential hypertension (Primary)  Assessment & Plan:  Hypertension is stable and controlled  Continue current treatment regimen.  Weight loss.  Regular aerobic exercise.  Blood pressure will be reassessed in 6 months.    Orders:  -     CT Cardiac Calcium Score Without Dye; Future    2. Mixed hyperlipidemia  Assessment & Plan:   Lipid abnormalities are worsening    Plan:  Lipid lowering therapy not prescribed due to patient refusal    Discussed medication dosage, use, side effects, and goals of treatment in detail.    Counseled patient on lifestyle modifications to help control hyperlipidemia.   Cardiac calcium CT scan ordered    Patient Treatment Goals:   LDL goal is under 100    Followup in 6 months.    Orders:  -     CT Cardiac Calcium Score Without Dye; Future    3. Tick bite, unspecified site, initial encounter  -     Lyme Disease Total Antibody With Reflex to Immunoassay  -     Ehrlichia Profile DNA PCR  -     Rickettsia Species DNA, Real-Time PCR  -     doxycycline (VIBRAMYICN) 100 MG tablet; Take 1 tablet by mouth 2 (Two) Times a Day for 14 days. Avoid dairy within 2 hours of dose  Dispense: 28  tablet; Refill: 0    4. Screening for prostate cancer  -     Cancel: PSA Screen    5. Low testosterone  -     Discontinue: Testosterone 20.25 MG/1.25GM (1.62%) gel; Place 1 Application on the skin as directed by provider Daily.  Dispense: 38 g; Refill: 0       Assessment & Plan             Follow Up   No follow-ups on file.  Patient was given instructions and counseling regarding his condition or for health maintenance advice. Please see specific information pulled into the AVS if appropriate.

## 2025-06-03 ENCOUNTER — PATIENT MESSAGE (OUTPATIENT)
Dept: FAMILY MEDICINE CLINIC | Facility: CLINIC | Age: 60
End: 2025-06-03
Payer: COMMERCIAL

## 2025-06-03 DIAGNOSIS — E29.1 HYPOGONADISM IN MALE: Primary | ICD-10-CM

## 2025-06-03 DIAGNOSIS — R79.89 LOW TESTOSTERONE: ICD-10-CM

## 2025-06-05 NOTE — SIGNIFICANT NOTE
Education provided the Patient on the following:    - Nothing to Eat or Drink after MN the night before the procedure  - Avoid red/purple fluids while completing their bowel prep as ordered by physician  - Contact Gastrointerologist office for any questions about specific details regarding colon prep  - You will need to have someone drive you home after your colonoscopy and remain with you for 24 hours after the procedure  - The date of your Surgery, you may have one visitor at bedside or within 10-15 minutes of [facility]  - Please wear warm socks when you arrive for your colonoscopy  - Remove all jewelry and leave any valuables before arriving the day of your procedure (all will have to be removed before leaving preop)  - You will need to arrive at 0715 on 6-9-25 for your colonoscopy at [facility] located at [address], [address].  - Feel free to contact us at: [phone] with any additional questions/concerns

## 2025-06-08 RX ORDER — TESTOSTERONE 20.25 MG/1.25G
1 GEL TOPICAL DAILY
Qty: 38 G | Refills: 0 | Status: SHIPPED | OUTPATIENT
Start: 2025-06-08

## 2025-06-09 ENCOUNTER — ANESTHESIA (OUTPATIENT)
Dept: SURGERY | Facility: SURGERY CENTER | Age: 60
End: 2025-06-09
Payer: COMMERCIAL

## 2025-06-09 ENCOUNTER — HOSPITAL ENCOUNTER (OUTPATIENT)
Facility: SURGERY CENTER | Age: 60
Setting detail: HOSPITAL OUTPATIENT SURGERY
Discharge: HOME | End: 2025-06-09
Attending: INTERNAL MEDICINE | Admitting: INTERNAL MEDICINE
Payer: COMMERCIAL

## 2025-06-09 ENCOUNTER — ANESTHESIA EVENT (OUTPATIENT)
Dept: SURGERY | Facility: SURGERY CENTER | Age: 60
End: 2025-06-09
Payer: COMMERCIAL

## 2025-06-09 VITALS
TEMPERATURE: 97.1 F | DIASTOLIC BLOOD PRESSURE: 76 MMHG | HEART RATE: 52 BPM | WEIGHT: 299.4 LBS | BODY MASS INDEX: 39.68 KG/M2 | RESPIRATION RATE: 16 BRPM | OXYGEN SATURATION: 94 % | SYSTOLIC BLOOD PRESSURE: 133 MMHG | HEIGHT: 73 IN

## 2025-06-09 DIAGNOSIS — Z86.0100 HISTORY OF COLON POLYPS: ICD-10-CM

## 2025-06-09 DIAGNOSIS — Z80.0 FAMILY HISTORY OF COLON CANCER IN MOTHER: ICD-10-CM

## 2025-06-09 DIAGNOSIS — Z12.11 ENCOUNTER FOR SCREENING FOR MALIGNANT NEOPLASM OF COLON: ICD-10-CM

## 2025-06-09 PROCEDURE — 45380 COLONOSCOPY AND BIOPSY: CPT | Performed by: INTERNAL MEDICINE

## 2025-06-09 PROCEDURE — 25010000002 PROPOFOL 10 MG/ML EMULSION: Performed by: NURSE ANESTHETIST, CERTIFIED REGISTERED

## 2025-06-09 PROCEDURE — 88305 TISSUE EXAM BY PATHOLOGIST: CPT | Performed by: INTERNAL MEDICINE

## 2025-06-09 PROCEDURE — 25810000003 LACTATED RINGERS PER 1000 ML: Performed by: NURSE PRACTITIONER

## 2025-06-09 PROCEDURE — 25010000002 PROPOFOL 1000 MG/100ML EMULSION: Performed by: NURSE ANESTHETIST, CERTIFIED REGISTERED

## 2025-06-09 PROCEDURE — 25010000002 LIDOCAINE 2% SOLUTION: Performed by: NURSE ANESTHETIST, CERTIFIED REGISTERED

## 2025-06-09 RX ORDER — PROPOFOL 10 MG/ML
INJECTION, EMULSION INTRAVENOUS AS NEEDED
Status: DISCONTINUED | OUTPATIENT
Start: 2025-06-09 | End: 2025-06-09 | Stop reason: SURG

## 2025-06-09 RX ORDER — SODIUM CHLORIDE 0.9 % (FLUSH) 0.9 %
10 SYRINGE (ML) INJECTION AS NEEDED
Status: DISCONTINUED | OUTPATIENT
Start: 2025-06-09 | End: 2025-06-09 | Stop reason: HOSPADM

## 2025-06-09 RX ORDER — SODIUM CHLORIDE 0.9 % (FLUSH) 0.9 %
3 SYRINGE (ML) INJECTION EVERY 12 HOURS SCHEDULED
Status: DISCONTINUED | OUTPATIENT
Start: 2025-06-09 | End: 2025-06-09 | Stop reason: HOSPADM

## 2025-06-09 RX ORDER — LIDOCAINE HYDROCHLORIDE 20 MG/ML
INJECTION, SOLUTION INFILTRATION; PERINEURAL AS NEEDED
Status: DISCONTINUED | OUTPATIENT
Start: 2025-06-09 | End: 2025-06-09 | Stop reason: SURG

## 2025-06-09 RX ORDER — SODIUM CHLORIDE, SODIUM LACTATE, POTASSIUM CHLORIDE, CALCIUM CHLORIDE 600; 310; 30; 20 MG/100ML; MG/100ML; MG/100ML; MG/100ML
30 INJECTION, SOLUTION INTRAVENOUS CONTINUOUS PRN
Status: DISCONTINUED | OUTPATIENT
Start: 2025-06-09 | End: 2025-06-09 | Stop reason: HOSPADM

## 2025-06-09 RX ADMIN — LIDOCAINE HYDROCHLORIDE 100 MG: 20 INJECTION, SOLUTION INFILTRATION; PERINEURAL at 08:29

## 2025-06-09 RX ADMIN — SODIUM CHLORIDE, POTASSIUM CHLORIDE, SODIUM LACTATE AND CALCIUM CHLORIDE 30 ML/HR: 600; 310; 30; 20 INJECTION, SOLUTION INTRAVENOUS at 07:58

## 2025-06-09 RX ADMIN — PROPOFOL 200 MCG/KG/MIN: 10 INJECTION, EMULSION INTRAVENOUS at 08:29

## 2025-06-09 RX ADMIN — PROPOFOL 100 MG: 10 INJECTION, EMULSION INTRAVENOUS at 08:29

## 2025-06-09 NOTE — ANESTHESIA POSTPROCEDURE EVALUATION
Patient: Fran Houser    Procedure Summary       Date: 06/09/25 Room / Location: [facility]  / [facility]    Anesthesia Start: 0825 Anesthesia Stop: 0851    Procedure: COLONOSCOPY TO CECUM WITH POLYPECTOMY Diagnosis:       Encounter for screening for malignant neoplasm of colon      History of colon polyps      Family history of colon cancer in mother      (Encounter for screening for malignant neoplasm of colon [Z12.11])      (History of colon polyps [Z86.0100])      (Family history of colon cancer in mother [Z80.0])    Surgeons: Terry Augustin MD Provider: Mookie Ramirez MD    Anesthesia Type: MAC ASA Status: 3            Anesthesia Type: MAC    Vitals  Vitals Value Taken Time   /78 06/09/25 09:12   Temp 36.2 °C (97.1 °F) 06/09/25 08:49   Pulse 48 06/09/25 09:11   Resp 16 06/09/25 09:03   SpO2 95 % 06/09/25 09:11   Vitals shown include unfiled device data.        Post Anesthesia Care and Evaluation    Patient location during evaluation: PACU  Patient participation: complete - patient participated  Level of consciousness: awake  Pain management: adequate    Airway patency: patent  Anesthetic complications: No anesthetic complications  PONV Status: none  Cardiovascular status: acceptable  Respiratory status: acceptable  Hydration status: acceptable

## 2025-06-09 NOTE — DISCHARGE INSTRUCTIONS
ENDOSCOPY - EGD/COLONOSCOPY       ADULT CARE DISCHARGE  INSTRUCTIONS     Symptoms you may temporarily experience:      Sore Throat     Hoarseness     Bloating/Cramping     Dizziness     IV Irritation/tenderness     Gas or Belching     Slight fever     Small amount of blood in vomit or stool       Call Your Doctor for the following Problems: ______________________     ________________     Fever of 101 degrees or higher       Sharp abdominal  pain     Red streak up the arm from the IV site     Severe cramping        Large amount of blood in stool or vomit      ***  If polyps were removed there is a risk of bleeding which is  more likely to occur 7-10 days after colonoscopy     Instructions for the next 24 hours after your Procedure:     Adult supervision     Do NOT drink any alcohol      Do not work today     NO important decisions     DO NOT sign any legal documents     You may shower/ bathe       DO NOT  DRIVE or operate machinery     Resume normal activity tomorrow       Discharge  Diet:     Avoid spicy/ greasy foods     Avoid any food that will cause more gas or bloating       *** Seek IMMEDIATE medical attention and call 911 if you develop symptoms such as:     Chest pain     Shortness of breath     Severe bleeding

## 2025-06-09 NOTE — H&P
No chief complaint on file.      HPI  H/o polyps         Problem List:    Patient Active Problem List   Diagnosis    Obstructive sleep apnea, adult    Non-seasonal allergic rhinitis    Chronic fatigue    Family history of colon cancer    Impaired fasting glucose    Essential hypertension    Annual physical exam    Personal history of colonic polyps    Encounter for screening for malignant neoplasm of colon    History of colon polyps    Family history of colon cancer in mother       Medical History:    Past Medical History:   Diagnosis Date    Acute appendicitis with localized peritonitis 2018    Fatty liver     Beginning stages    Hernia Noticed during tests for appendectomy    Very small at that time    Hypertension 2022    Slightly high. Only taking half a pill.    Obesity     Sleep apnea     pt uses cpap        Social History:    Social History     Socioeconomic History    Marital status:    Tobacco Use    Smoking status: Never    Smokeless tobacco: Never   Vaping Use    Vaping status: Never Used   Substance and Sexual Activity    Alcohol use: Yes     Alcohol/week: 3.0 standard drinks of alcohol     Types: 3 Cans of beer per week     Comment: Occasional craft beer    Drug use: Never    Sexual activity: Yes     Partners: Female     Birth control/protection: None       Family History:   Family History   Problem Relation Age of Onset    Colon cancer Mother         Found at 84 years old    Diabetes Mother     Thyroid disease Mother     Hypertension Mother     Cancer Mother         Colorectal (diagnosed at 84)    Heart disease Father          at 47 in . Arteriosclerosis.    Heart attack Father     Diabetes Maternal Grandfather          in     Colon polyps Neg Hx     Crohn's disease Neg Hx     Irritable bowel syndrome Neg Hx     Ulcerative colitis Neg Hx        Surgical History:   Past Surgical History:   Procedure Laterality Date    APPENDECTOMY N/A 2018    Procedure:  APPENDECTOMY LAPAROSCOPIC;  Surgeon: Alex Kuo MD;  Location: [facility] OR;  Service: General    COLONOSCOPY  Oct. 2021 at [facility]    1st colonoscopy / 6 benign polyps removed    COLONOSCOPY W/ POLYPECTOMY N/A 10/21/2021    Procedure: COLONOSCOPY WITH POLYPECTOMY;  Surgeon: Elier Poon MD;  Location: [facility] OR;  Service: Gastroenterology;  Laterality: N/A;  poor prep  diverticulosis  ascending colon polyps x4 (cold snare x1)  transverse colon polyps x2 (cold snare x1)    SKIN CANCER EXCISION  03/14/2023    SKIN CANCER EXCISION  01/2024    face       No current facility-administered medications for this encounter.    Current Outpatient Medications:     atorvastatin (LIPITOR) 20 MG tablet, TAKE 1 TABLET BY MOUTH ONCE DAILY, Disp: 90 tablet, Rfl: 1    doxycycline (VIBRAMYICN) 100 MG tablet, Take 1 tablet by mouth 2 (Two) Times a Day for 14 days. Avoid dairy within 2 hours of dose, Disp: 28 tablet, Rfl: 0    fluticasone (FLONASE) 50 MCG/ACT nasal spray, Administer 2 sprays into the nostril(s) as directed by provider Daily As Needed for Allergies., Disp: , Rfl:     loratadine (Claritin) 10 MG tablet, Take 1 tablet by mouth Daily As Needed for Allergies., Disp: , Rfl:     losartan-hydrochlorothiazide (HYZAAR) 50-12.5 MG per tablet, Take 1 tablet by mouth Daily., Disp: 90 tablet, Rfl: 3    Testosterone 20.25 MG/1.25GM (1.62%) gel, Place 1 Application on the skin as directed by provider Daily., Disp: 38 g, Rfl: 0    Allergies:   Allergies   Allergen Reactions    Other Food Allergy Other (See Comments)     Apples cause sour stomach and gums to itch        The following portions of the patient's history were reviewed by me and updated as appropriate: review of systems, allergies, current medications, past family history, past medical history, past social history, past surgical history and problem list.    There were no vitals filed for this visit.    PHYSICAL EXAM:    CONSTITUTIONAL:  today's  vital signs reviewed by me  GASTROINTESTINAL: abdomen is soft nontender nondistended with normal active bowel sounds, no masses are appreciated    Assessment/ Plan  H/o polyps    colonoscopy    Risks and benefits as well as alternatives to endoscopic evaluation were explained to the patient and they voiced understanding and wish to proceed.  These risks include but are not limited to the risk of bleeding, perforation, adverse reaction to sedation, and missed lesions.  The patient was given the opportunity to ask questions prior to the endoscopic procedure.

## 2025-06-09 NOTE — ANESTHESIA PREPROCEDURE EVALUATION
Anesthesia Evaluation     NPO Solid Status: > 8 hours  NPO Liquid Status: > 8 hours           Airway   Mallampati: I  No difficulty expected  Dental      Pulmonary    (+) ,sleep apnea  Cardiovascular   Exercise tolerance: good (4-7 METS)    (+) hypertension      Neuro/Psych  GI/Hepatic/Renal/Endo    (+) morbid obesity    Musculoskeletal     Abdominal    Substance History      OB/GYN          Other                    Anesthesia Plan    ASA 3     MAC     intravenous induction     Anesthetic plan, risks, benefits, and alternatives have been provided, discussed and informed consent has been obtained with: patient.    CODE STATUS:

## 2025-06-10 LAB
CYTO UR: NORMAL
LAB AP CASE REPORT: NORMAL
LAB AP CLINICAL INFORMATION: NORMAL
PATH REPORT.FINAL DX SPEC: NORMAL
PATH REPORT.GROSS SPEC: NORMAL

## 2025-06-14 PROBLEM — E78.2 MIXED HYPERLIPIDEMIA: Status: ACTIVE | Noted: 2025-06-14

## 2025-06-14 NOTE — ASSESSMENT & PLAN NOTE
Lipid abnormalities are worsening    Plan:  Lipid lowering therapy not prescribed due to patient refusal    Discussed medication dosage, use, side effects, and goals of treatment in detail.    Counseled patient on lifestyle modifications to help control hyperlipidemia.   Cardiac calcium CT scan ordered    Patient Treatment Goals:   LDL goal is under 100    Followup in 6 months.

## 2025-06-27 ENCOUNTER — HOSPITAL ENCOUNTER (OUTPATIENT)
Dept: CT IMAGING | Facility: HOSPITAL | Age: 60
Discharge: HOME | End: 2025-06-27
Admitting: FAMILY MEDICINE
Payer: COMMERCIAL

## 2025-06-27 DIAGNOSIS — I10 ESSENTIAL HYPERTENSION: ICD-10-CM

## 2025-06-27 DIAGNOSIS — E78.2 MIXED HYPERLIPIDEMIA: ICD-10-CM

## 2025-06-27 PROCEDURE — 75571 CT HRT W/O DYE W/CA TEST: CPT

## 2025-07-02 DIAGNOSIS — R93.1 ELEVATED CORONARY ARTERY CALCIUM SCORE: Primary | ICD-10-CM

## (undated) DEVICE — BNDG ADHS CURAD FLX/FABRC 2X4IN STRL LF

## (undated) DEVICE — ENDOPATH 5MM ENDOSCOPIC BLUNT TIP DISSECTORS (12 POUCHES CONTAINING 3 DISSECTORS EACH): Brand: ENDOPATH

## (undated) DEVICE — BLD CLIP UNIV SURG GRY

## (undated) DEVICE — FLEX ADVANTAGE 1500CC: Brand: FLEX ADVANTAGE

## (undated) DEVICE — JACKT LAB F/R KNIT CUFF/COLR XLG BLU

## (undated) DEVICE — APPL CHLORAPREP W/TINT 26ML ORNG

## (undated) DEVICE — SPONGE,DRAIN,NONWVN,4"X4",6PLY,STRL,LF: Brand: MEDLINE

## (undated) DEVICE — GLV SURG SENSICARE PI MIC PF SZ7.5 LF STRL

## (undated) DEVICE — GLV SURG EUDERMIC PF LTX 8 STRL

## (undated) DEVICE — VIAL FORMLN CAP 10PCT 20ML

## (undated) DEVICE — SYR LL 3CC

## (undated) DEVICE — HYBRID TUBING/CAP SET FOR OLYMPUS® SCOPES: Brand: ERBE

## (undated) DEVICE — ADAPT CLN BIOGUARD AIR/H2O DISP

## (undated) DEVICE — Device

## (undated) DEVICE — SUT ETHIB 0/0 MO6 I8IN CX45D

## (undated) DEVICE — GOWN ISOL W/THUMB UNIV BLU BX/15

## (undated) DEVICE — SUT MNCRYL 4/0 PS2 18 IN

## (undated) DEVICE — TRAY,CATHETER,URETHRAL,RED-RUBBER,15FR: Brand: MEDLINE

## (undated) DEVICE — BW-412T DISP COMBO CLEANING BRUSH: Brand: SINGLE USE COMBINATION CLEANING BRUSH

## (undated) DEVICE — METZENBAUM SCISSOR TIP, DISPOSABLE: Brand: RENEW

## (undated) DEVICE — FRCP BX RADJAW4 NDL 2.8 240CM LG OG BX40

## (undated) DEVICE — TRAP SPECI MUCUS 80CC

## (undated) DEVICE — SNAR POLYP SENSATION STDOVL 27 240 BX40

## (undated) DEVICE — TBG INSUFL W FLTR STRL

## (undated) DEVICE — SINGLE-USE BIOPSY FORCEPS: Brand: RADIAL JAW 4

## (undated) DEVICE — BNDG ADHS PLSTC 1X3IN LF

## (undated) DEVICE — 5 MM BABCOCKS WITH RATCHET HANDLES: Brand: ENDOPATH

## (undated) DEVICE — ADAPT CLN SCPE ENDO PORPOISE BX/50 DISP

## (undated) DEVICE — CANN O2 ETCO2 FITS ALL CONN CO2 SMPL A/ 7IN DISP LF

## (undated) DEVICE — SPNG GZ WOVN 4X4IN 12PLY 10/BX STRL

## (undated) DEVICE — PK LAP GEN 90

## (undated) DEVICE — TRAP POLYP 4CHAMBER

## (undated) DEVICE — ENDOPOUCH RETRIEVER SPECIMEN RETRIEVAL BAGS: Brand: ENDOPOUCH RETRIEVER

## (undated) DEVICE — LAPAROSCOPIC TROCAR SLEEVE/SINGLE USE: Brand: KII® OPTICAL ACCESS SYSTEM

## (undated) DEVICE — SUCTION CANISTER, 1000CC,SAFELINER: Brand: DEROYAL

## (undated) DEVICE — TROCAR: Brand: KII® SLEEVE

## (undated) DEVICE — PROXIMATE RH ROTATING HEAD SKIN STAPLERS (35 WIDE) CONTAINS 35 STAINLESS STEEL STAPLES: Brand: PROXIMATE

## (undated) DEVICE — DECANTER: Brand: UNBRANDED

## (undated) DEVICE — KT ORCA ORCAPOD DISP STRL

## (undated) DEVICE — ENDOPATH XCEL BLUNT TIP TROCARS WITH SMOOTH SLEEVES: Brand: ENDOPATH XCEL

## (undated) DEVICE — VIAL FORMALIN CAP 10P 40ML

## (undated) DEVICE — TOWEL,OR,DSP,ST,BLUE,STD,4/PK,20PK/CS: Brand: MEDLINE

## (undated) DEVICE — RESERVOIR,SUCTION,100CC,SILICONE: Brand: MEDLINE

## (undated) DEVICE — TP SILK DURAPORE 2 IN

## (undated) DEVICE — GOWN PROC ENDOARMOR GI LVL3 HY/SHLD UNIV